# Patient Record
Sex: FEMALE | Race: WHITE | Employment: FULL TIME | URBAN - METROPOLITAN AREA
[De-identification: names, ages, dates, MRNs, and addresses within clinical notes are randomized per-mention and may not be internally consistent; named-entity substitution may affect disease eponyms.]

---

## 2019-05-15 ENCOUNTER — HOSPITAL ENCOUNTER (EMERGENCY)
Age: 29
Discharge: HOME | End: 2019-05-15
Attending: EMERGENCY MEDICINE
Payer: COMMERCIAL

## 2019-05-15 VITALS
HEIGHT: 68 IN | WEIGHT: 250.88 LBS | SYSTOLIC BLOOD PRESSURE: 158 MMHG | DIASTOLIC BLOOD PRESSURE: 96 MMHG | TEMPERATURE: 99 F | OXYGEN SATURATION: 99 % | BODY MASS INDEX: 38.02 KG/M2 | HEART RATE: 76 BPM

## 2019-05-15 DIAGNOSIS — M79.661 PAIN AND SWELLING OF RIGHT LOWER LEG: Primary | ICD-10-CM

## 2019-05-15 DIAGNOSIS — M79.89 PAIN AND SWELLING OF RIGHT LOWER LEG: Primary | ICD-10-CM

## 2019-05-15 DIAGNOSIS — M50.30 DDD (DEGENERATIVE DISC DISEASE), CERVICAL: ICD-10-CM

## 2019-05-15 PROCEDURE — 99283 EMERGENCY DEPT VISIT LOW MDM: CPT

## 2019-05-15 ASSESSMENT — PAIN DESCRIPTION - DESCRIPTORS: DESCRIPTORS: SORE

## 2019-05-15 ASSESSMENT — PAIN DESCRIPTION - ORIENTATION: ORIENTATION: RIGHT;LEFT

## 2019-05-15 ASSESSMENT — PAIN DESCRIPTION - FREQUENCY: FREQUENCY: CONTINUOUS

## 2019-05-15 ASSESSMENT — PAIN DESCRIPTION - LOCATION: LOCATION: LEG

## 2019-05-15 ASSESSMENT — PAIN SCALES - GENERAL: PAINLEVEL_OUTOF10: 4

## 2019-05-15 NOTE — ED PROVIDER NOTES
Emergency Physician Note    Chief Complaint  Leg Swelling       History of Present Illness  Manish Loredo is a 33 y.o. female who presents to the ED for leg swelling, right leg pain, transient episode of numbness and tingling of her lower feet and her hands. Patient states starting Wednesday she noticed bilateral lower extremity swelling, she's been using compression stockings intermittently since then and is somewhat helped with her symptoms. Today she was lying in bed when all of a sudden she felt numbness and tingling throughout of both lower extremities and in both hands. The symptoms were transient and resolved shortly thereafter. Patient feels that her right leg is a little bit more swollen than her left. She denies any chest pain or shortness of breath. She does have chronic neck pain that is unchanged and the pain is mostly on the left side of her neck. She denies any lower back pain at this time although sometimes she gets lower back pain intermittently. Denies fever, chills, malaise, chest pain, shortness of breath, cough, abdominal pain, nausea, vomiting, diarrhea, headache, sore throat, dysuria, back pain, rash. No palliative/provocative factors. Positives and pertinent negatives as per HPI. All other of the 10 systems were reviewed and are negative. I have reviewed the following from the nursing documentation:      Prior to Admission medications    Medication Sig Start Date End Date Taking? Authorizing Provider   amphetamine-dextroamphetamine (ADDERALL, 20MG,) 20 MG tablet Take 1 tablet by mouth 2 times daily for 31 days.  5/8/19 6/8/19 Yes Wm Wallace MD   ibuprofen (ADVIL;MOTRIN) 800 MG tablet Take 1 tablet by mouth every 8 hours as needed for Pain 4/11/19  Yes Wm Wallace MD   citalopram (CELEXA) 20 MG tablet Take 1 tablet by mouth daily 4/11/19  Yes Wm Wallace MD   metoprolol succinate (TOPROL XL) 50 MG extended release tablet Take 1 tablet by mouth daily 19  Yes Joel Sims MD   phentermine (ADIPEX-P) 37.5 MG capsule Take 1 capsule by mouth every morning for 30 days.  19  Joel Sims MD       Allergies as of 05/15/2019 - Review Complete 05/15/2019   Allergen Reaction Noted    Diflucan [fluconazole]  2017    Zithromax [azithromycin]  2017       Past Medical History:   Diagnosis Date    ADHD (attention deficit hyperactivity disorder)     not on meds        Surgical History:   Past Surgical History:   Procedure Laterality Date     SECTION          Family History:    Family History   Problem Relation Age of Onset    Cancer Mother         skin    Depression Mother     High Blood Pressure Mother     Early Death Father         kidney failure    Kidney Disease Father        Social History     Socioeconomic History    Marital status: Single     Spouse name: Not on file    Number of children: Not on file    Years of education: Not on file    Highest education level: Not on file   Occupational History    Not on file   Social Needs    Financial resource strain: Not on file    Food insecurity:     Worry: Not on file     Inability: Not on file    Transportation needs:     Medical: Not on file     Non-medical: Not on file   Tobacco Use    Smoking status: Current Every Day Smoker     Packs/day: 1.00    Smokeless tobacco: Never Used   Substance and Sexual Activity    Alcohol use: Yes     Comment: rarely    Drug use: No    Sexual activity: Yes     Partners: Male   Lifestyle    Physical activity:     Days per week: Not on file     Minutes per session: Not on file    Stress: Not on file   Relationships    Social connections:     Talks on phone: Not on file     Gets together: Not on file     Attends Mu-ism service: Not on file     Active member of club or organization: Not on file     Attends meetings of clubs or organizations: Not on file     Relationship status: Not on file    Intimate partner violence: Fear of current or ex partner: Not on file     Emotionally abused: Not on file     Physically abused: Not on file     Forced sexual activity: Not on file   Other Topics Concern    Not on file   Social History Narrative    Not on file       Nursing notes reviewed. ED Triage Vitals   Enc Vitals Group      BP 05/15/19 0234 (!) 159/100      Pulse 05/15/19 0234 76      Resp --       Temp 05/15/19 0239 99 °F (37.2 °C)      Temp Source 05/15/19 0239 Oral      SpO2 05/15/19 0234 100 %      Weight 05/15/19 0234 250 lb 14.1 oz (113.8 kg)      Height 05/15/19 0234 5' 8\" (1.727 m)      Head Circumference --       Peak Flow --       Pain Score --       Pain Loc --       Pain Edu? --       Excl. in [place]? --        GENERAL:  Awake, alert. Well developed, well nourished with no apparent distress. HENT:  Normocephalic, Atraumatic, no lacerations. EYES:  Conjunctiva normal, Pupils equal round and reactive to light, extraocular movements normal.  NECK:  Trachea is midline. No stridor. CHEST:  Regular rate and regular rhythm, no murmurs/rubs/gallops, normal S1/S2, chest wall non-tender. LUNGS:  No respiratory distress. No abdominal retractions, no sternal retractions. Clear to auscultation bilaterally, no wheezing, no rhochi, no rales  ABDOMEN:  Soft, non-tender, non-distended. No guarding and no rebound. No costovertebral angle tenderness to palpation. Normal BS, no organomegaly, no abdominal masses  EXTREMITIES:  Normal range of motion, no pitting edema, no tenderness, no deformity, distal pulses present and equal bilaterally. Moves extremities x4 with purpose. The right calf does appear slightly more swollen compared to the left calf, there is some reproducible tenderness to palpation of the right calf. BACK:  No midline tenderness in the cervical, thoracic, and lumbar spine. No deformities, no step-off. Palpation did not elicit any paraspinous tenderness. SKIN: Warm, dry and intact.    NEUROLOGIC: Normal mental status. Moving all extremities to command. Alert and oriented x4 without focal deficit or gross sensory deficit. Normal speech. MUR exam of both upper extremities are without focal neurological deficits. PSYCHIATRIC: Not anxious, normal mood and affect, thoughts are linear and organized, without delusions/hallucinations, responds appropriately to questions      LABS and DIAGNOSTIC RESULTS    LABS  No results found for this visit on 05/15/19. PROCEDURES      MEDICAL DECISION MAKING    Procedures/interventions/images ordered for this visit  No orders of the defined types were placed in this encounter. Medications ordered for this visit  No orders of the defined types were placed in this encounter. This is a very pleasant patient who has been seen and evaluated for a possible DVT. There was no significant trauma to suspect fracture, or evidence of infection, compartment syndrome, or any neurovascular compromise on exam.  Patient's vitals have been reviewed and are stable. Based on these findings and the fact that the patient is not presenting with any acute cardiopulmonary symptoms, there is no concern specifically for an acute pulmonary embolism. Doppler ultrasound was not completed today given that vascular ultrasound is not in hospital at this time, patient was given orders to have a follow-up study done as an outpatient. Patient is aware that today's workup does not fully exclude the possibility of a DVT. Patient has been instructed to follow-up with their regular doctor for a repeat ultrasound within one week to fully exclude this diagnosis.       High Sensitivity Neuro Exam (HSNE) Spine  If Neck Pain:  C1-2, 3, 4 Sensation back of head and neck: Present  C5 Deltoid (motor): Present  C6 Biceps (motor): Present  C7 Extend Wrist/Fingers: Present  C8 Flex Fingers: Present  T1 Move fingers apart: Present    Red Flags  Minor (1 Point Each)  Alcohol Abuse: +0 No  Diabetes Mellitus: +0 or other process requiring immediate medical or surgical intervention at this time. We will attempt to manage the patients pain. It is understood that if the patient is not improving as expected or if other new symptoms or signs of concern develop, other etiologies or diagnoses may need to be considered requiring other tests, treatments, consultations, and/or admission. The diagnosis, plan, expected course, follow-up, and return precautions were discussed and all questions were answered. Final Impression    1. Pain and swelling of right lower leg    2. DDD (degenerative disc disease), cervical        Blood pressure (!) 158/96, pulse 76, temperature 99 °F (37.2 °C), temperature source Oral, height 5' 8\" (1.727 m), weight 250 lb 14.1 oz (113.8 kg), SpO2 99 %, unknown if currently breastfeeding. Patient and/or companions verbalized understanding of the ED workup, any relevant findings as well as any incidental findings, and the disposition and plan. Questions sought and answered with the patient and/or family members. They voice understanding and agree with plan. If the patient was discharged from the ED, they were instructed to return for any worsening or worrisome concerns. Patient was given scripts for the following medications. I counseled patient how to take these medications. Discharge Medication List as of 5/15/2019  4:04 AM          Disposition  Pt is in stable condition upon Discharge to home. The note was completed using Dragon voice recognition transcription. Every effort was made to ensure accuracy; however, inadvertent transcription errors may be present despite my best efforts to edit errors.     Laura Abel MD  [address] MD  05/15/19 0583

## 2019-05-15 NOTE — ED TRIAGE NOTES
Bilat lower extremity edema for a few days. Pt has consulted PCP about this and was told to wear rebecca hose because she was retaining water. Pt has been wearing those but states that tonight her feet and legs felt numb and she had a burning sensation in her thighs.

## 2021-05-18 ENCOUNTER — HOSPITAL ENCOUNTER (EMERGENCY)
Age: 31
Discharge: HOME | End: 2021-05-18
Attending: EMERGENCY MEDICINE
Payer: COMMERCIAL

## 2021-05-18 VITALS
WEIGHT: 268.74 LBS | SYSTOLIC BLOOD PRESSURE: 122 MMHG | OXYGEN SATURATION: 99 % | HEART RATE: 70 BPM | DIASTOLIC BLOOD PRESSURE: 87 MMHG | RESPIRATION RATE: 18 BRPM | TEMPERATURE: 98.5 F | BODY MASS INDEX: 40.86 KG/M2

## 2021-05-18 DIAGNOSIS — J02.9 ACUTE PHARYNGITIS, UNSPECIFIED ETIOLOGY: Primary | ICD-10-CM

## 2021-05-18 PROCEDURE — 99284 EMERGENCY DEPT VISIT MOD MDM: CPT

## 2021-05-18 PROCEDURE — 6360000002 HC RX W HCPCS: Performed by: EMERGENCY MEDICINE

## 2021-05-18 PROCEDURE — 6370000000 HC RX 637 (ALT 250 FOR IP): Performed by: EMERGENCY MEDICINE

## 2021-05-18 RX ORDER — NAPROXEN 500 MG/1
500 TABLET ORAL 2 TIMES DAILY WITH MEALS
Qty: 60 TABLET | Refills: 0 | Status: SHIPPED | OUTPATIENT
Start: 2021-05-18 | End: 2021-08-10

## 2021-05-18 RX ORDER — IBUPROFEN 400 MG/1
400 TABLET ORAL ONCE
Status: COMPLETED | OUTPATIENT
Start: 2021-05-18 | End: 2021-05-18

## 2021-05-18 RX ORDER — DEXAMETHASONE SODIUM PHOSPHATE 4 MG/ML
4 INJECTION, SOLUTION INTRA-ARTICULAR; INTRALESIONAL; INTRAMUSCULAR; INTRAVENOUS; SOFT TISSUE ONCE
Status: COMPLETED | OUTPATIENT
Start: 2021-05-18 | End: 2021-05-18

## 2021-05-18 RX ADMIN — IBUPROFEN 400 MG: 400 TABLET, FILM COATED ORAL at 06:41

## 2021-05-18 RX ADMIN — DEXAMETHASONE SODIUM PHOSPHATE 4 MG: 4 INJECTION, SOLUTION INTRAMUSCULAR; INTRAVENOUS at 06:41

## 2021-05-18 ASSESSMENT — PAIN SCALES - GENERAL
PAINLEVEL_OUTOF10: 10
PAINLEVEL_OUTOF10: 7

## 2021-05-18 ASSESSMENT — PAIN - FUNCTIONAL ASSESSMENT: PAIN_FUNCTIONAL_ASSESSMENT: 0-10

## 2021-05-18 NOTE — ED PROVIDER NOTES
Emergency Physician Note  [address] [facility] [place] [address]  Dept: [phone]  Loc: [phone]  Open Note Time:  6:31 AM EDT    Chief Complaint  Pharyngitis (woke up this morning with sore throat, denies any fevrs. c/o headache. )       History of Present Illness  Jasbir Prasad is a 35 y.o. female  has a past medical history of ADHD (attention deficit hyperactivity disorder) and Hypertension. who presents to the ED for throat. Patient thinks that she may have had a little bit of itchiness in her throat last night however when she woke up this morning she had significant sore throat she states it painful to swallow however she is still able to swallow fluids. She has had intermittent mild cough. No fever. She reports that she has a headache it is frontal is across her forehead however she believes it is due to her sinuses being congested. She is also had some runny nose recently. Denies fever, chills, malaise, chest pain, shortness of breath,  abdominal pain, nausea, vomiting, diarrhea. No palliative/provocative factors. Unless otherwise stated in this report or unable to obtain because of the patient's clinical or mental status as evidenced by the medical record, this patient's positive and negative responses for review of systems, constitutional, psych, eyes, ENT, cardiovascular, respiratory, gastrointestinal, neurological, genitourinary, musculoskeletal, integument systems and systems related to the presenting problem are either stated in the preceding paragraph or were not pertinent or were negative for the symptoms and/or complaints related to the medical problem. I have reviewed the following from the nursing documentation:      Prior to Admission medications    Medication Sig Start Date End Date Taking?  Authorizing Provider   dexamethasone (DEXAMETHASONE INTENSOL) 1 MG/ML solution Take 4 mLs by mouth daily as needed (sore throat) 21  Yes Annalee Arteaga MD   naproxen (NAPROSYN) 500 MG tablet Take 1 tablet by mouth 2 times daily (with meals) 21  Yes Annalee Arteaga MD   amphetamine-dextroamphetamine (ADDERALL, 20MG,) 20 MG tablet Take 1 tablet by mouth 2 times daily for 31 days. 5/10/21 6/10/21 Yes Dayna Arizmendi MD   metoprolol succinate (TOPROL XL) 100 MG extended release tablet Take 1 tablet by mouth daily 5/10/21  Yes Dayna Arizmendi MD   albuterol sulfate HFA (VENTOLIN HFA) 108 (90 Base) MCG/ACT inhaler Inhale 2 puffs into the lungs 4 times daily as needed for Wheezing 21   Dayna Arizmendi MD   ibuprofen (ADVIL;MOTRIN) 800 MG tablet Take 1 tablet by mouth every 8 hours as needed for Pain 3/2/21   Dayna Arizmendi MD   ketoconazole (NIZORAL) 2 % cream Apply topically daily.  20   Dayna Arizmendi MD   albuterol sulfate HFA (VENTOLIN HFA) 108 (90 Base) MCG/ACT inhaler Inhale 2 puffs into the lungs 4 times daily as needed for Wheezing 20   Dayna Arizmendi MD       Allergies as of 2021 - Fully Reviewed 2021   Allergen Reaction Noted    Diflucan [fluconazole]  2017    Zithromax [azithromycin]  2017       Past Medical History:   Diagnosis Date    ADHD (attention deficit hyperactivity disorder)     not on meds    Hypertension         Surgical History:   Past Surgical History:   Procedure Laterality Date     SECTION          Family History:    Family History   Problem Relation Age of Onset    Cancer Mother         skin    Depression Mother     High Blood Pressure Mother     Early Death Father         kidney failure    Kidney Disease Father        Social History     Socioeconomic History    Marital status: Single     Spouse name: Not on file    Number of children: Not on file    Years of education: Not on file    Highest education level: Not on file   Occupational History    Not on file   Tobacco Use    Smoking status: Current Every Day Smoker     Packs/day: 1.00    Smokeless tobacco: Never Used   Substance and Sexual Activity    Alcohol use: Yes     Comment: rarely    Drug use: No    Sexual activity: Yes     Partners: Male   Other Topics Concern    Not on file   Social History Narrative    Not on file     Social Determinants of Health     Financial Resource Strain:     Difficulty of Paying Living Expenses:    Food Insecurity:     Worried About Running Out of Food in the Last Year:     [address] in the Last Year:    Transportation Needs:     Lack of Transportation (Medical):  Lack of Transportation (Non-Medical):    Physical Activity:     Days of Exercise per Week:     Minutes of Exercise per Session:    Stress:     Feeling of Stress :    Social Connections:     Frequency of Communication with Friends and Family:     Frequency of Social Gatherings with Friends and Family:     Attends [employer] Services:     Active Member of Clubs or Organizations:     Attends Club or Organization Meetings:     Marital Status:    Intimate Partner Violence:     Fear of Current or Ex-Partner:     Emotionally Abused:     Physically Abused:     Sexually Abused:        Nursing notes reviewed. ED Triage Vitals [05/18/21 0617]   Enc Vitals Group      /87      Pulse 70      Resp 18      Temp 98.5 °F (36.9 °C)      Temp Source Oral      SpO2 99 %      Weight 268 lb 11.9 oz (121.9 kg)      Height       Head Circumference       Peak Flow       Pain Score       Pain Loc       Pain Edu? Excl. in [address]? GENERAL:   Body mass index is 40.86 kg/m². Awake, alert. Well developed, well nourished with no apparent distress. Nontoxic-appearing, non-ill-appearing. HENT:   Normocephalic, Atraumatic, no lacerations.   Tympanic membranes are without bulging, without erythema, without hemotympanum, with middle ear effusion bilaterally  Oropharynx clear, no tonsilar inflammation, no tonsilar exudates,  no airway obstruction, moist mucous membranes. Uvula was midline and has symmetric rise. EYES:   Conjunctiva normal,   Pupils equal round and reactive to light,   Extraocular movements normal.  NECK:  Trachea is midline. No stridor. No lymphadenopathy of the anterior cervical chain  No lymphadenopathy of the posterior cervical chain. No meningeal signs, Supple without JVD. CHEST:  Regular rate and regular rhythm, no murmurs/rubs/gallops,  normal S1/S2, chest wall non-tender. LUNGS:  No respiratory distress. No abdominal retractions, no sternal retractions  Clear to auscultation bilaterally, no wheezing, no rhochi, no rales  Speaking comfortably in full sentences  ABDOMEN:  Soft, non-tender, non-distended. No guarding. No rebound. No costovertebral angle tenderness to palpation. Normal BS, no organomegaly, no abdominal masses  EXTREMITIES:  Moves extremities x4 with purpose. Normal range of motion, no edema,  No tenderness, no deformity,  distal pulses present and equal bilaterally. BACK:  No midline tenderness in the cervical, thoracic, and lumbar spine. No deformities, no step-off. SKIN:  Warm, dry and intact. NEUROLOGIC:  Normal mental status. Moving all extremities to command. Alert and oriented x4  without focal motor deficit or gross sensory deficit. Normal speech. PSYCHIATRIC:  Not anxious,  normal mood and affect,  Appropriate eye contact,  thoughts are linear and organized,  without delusions/hallucinations,  Not responding to internal stimuli,  responds appropriately to questions    LABS and DIAGNOSTIC RESULTS    LABS  No results found for this visit on 05/18/21. SCREENINGS  NIH Score     [facility]     [facility]    Heart Score       PROCEDURES    MEDICAL DECISION MAKING    Procedures/interventions/images ordered for this visit  No orders of the defined types were placed in this encounter.       Medications ordered for this visit  Orders Placed This Encounter   Medications    ibuprofen (ADVIL;MOTRIN) 100 MG/5ML suspension 600 mg    dexamethasone (DECADRON) 1 MG/ML solution 4 mg    dexamethasone (DEXAMETHASONE INTENSOL) 1 MG/ML solution     Sig: Take 4 mLs by mouth daily as needed (sore throat)     Dispense:  4 mL     Refill:  0    naproxen (NAPROSYN) 500 MG tablet     Sig: Take 1 tablet by mouth 2 times daily (with meals)     Dispense:  60 tablet     Refill:  0       ED course notes for this visit       I wore surgical mask and gloves when I evaluated the patient. I evaluated the patient in room QC 01/01    Differential diagnosis: Group A strep, Airway Obstruction, Epiglottitis, Retropharyngeal Abscess, Parapharyngeal Abscess, Pneumonia, Hypoxemia, Dehydration, Reflux Pharyngitis, other    Patient was treated with pain medications and steroids for symptomatic relief. Based on Censor criteria patient did not need a rapid strep. This is a very pleasant patient with pharyngitis but without significant evidence of Group A Beta-Hemolytic Streptococcal infection or mononucleosis by clinical and/or laboratory criteria. The patient does not have erythematous tonsils and her tonsils are without exudate and no lymphadenopathy. In addition to the noted physical exam, I did not observe brawny edema, uvular deviation, menigismus, stridor, trismus, or drooling. Nontoxic appearance and no significant distress. No sign of Aroldos Angina or deep space neck infection. There is no significant evidence of toxicity, shock, sepsis, airway compromise, respiratory distress, hemodynamic instability, epiglottitis, peritonsillar abscess, retropharyngeal abscess, bacterial tracheitis, other bacterial infection, acute allergic reaction or angioedema, or any disease process requiring immediate surgical intervention at this time.  It is understood that if the patient is not improving as expected or if other new symptoms or signs of concern develop, other etiologies or diagnoses may need to be considered requiring other tests, treatments, consultations, and/or admission. The diagnosis, plan, expected course, follow-up, and return precautions were discussed and all questions were answered. Final Impression    1. Acute pharyngitis, unspecified etiology        Blood pressure 122/87, pulse 70, temperature 98.5 °F (36.9 °C), temperature source Oral, resp. rate 18, weight 268 lb 11.9 oz (121.9 kg), SpO2 99 %, unknown if currently breastfeeding. Medication Summary  Patient was given scripts for the following medications. I counseled patient how to take these medications. New Prescriptions    DEXAMETHASONE (DEXAMETHASONE INTENSOL) 1 MG/ML SOLUTION    Take 4 mLs by mouth daily as needed (sore throat)    NAPROXEN (NAPROSYN) 500 MG TABLET    Take 1 tablet by mouth 2 times daily (with meals)       Patient had scripts modified or refilled for the following medications. I counseled patient how to take these medications. Modified Medications    No medications on file       Patient had scripts discontinues for the following medications. I counseled patient to stop taking these medications. Discontinued Medications    No medications on file         Disposition  At this point I do not feel the patient requires further work up and it is reasonable to discharge the patient. I had a discussion with the patient and/or their surrogate regarding diagnosis, diagnostic testing results, treatment/ plan of care, and follow up. Patient and/or companions verbalized understanding of the ED workup, any relevant findings as well as any incidental findings, and the disposition and plan. There was shared decision-making between myself as well as the patient and/or their surrogate and we are all in agreement with discharge home. Damien Pittman was an opportunity for questions and all questions were answered to the best of my ability and to the satisfaction of the patient and/or patient's family. Patient agreed to follow up as recommend for further evaluation/treatment.  The patient was given strict return precautions as we discussed symptoms that would necessitate return to the ED. Patient will return to ED for new/worsening symptoms. The patient verbalized their understanding and agreement with the above plan. Please refer to AVS for further details regarding discharge instructions. Pt is in stable condition upon Discharge to home. The note was completed using Dragon voice recognition transcription. Every effort was made to ensure accuracy; however, inadvertent transcription errors may be present despite my best efforts to edit errors.     Tawanda Bates MD  [address] [place], MD  05/18/21 6094

## 2021-06-20 ENCOUNTER — APPOINTMENT (OUTPATIENT)
Dept: GENERAL RADIOLOGY | Age: 31
End: 2021-06-20
Payer: COMMERCIAL

## 2021-06-20 ENCOUNTER — HOSPITAL ENCOUNTER (EMERGENCY)
Age: 31
Discharge: HOME | End: 2021-06-20
Attending: EMERGENCY MEDICINE
Payer: COMMERCIAL

## 2021-06-20 VITALS
WEIGHT: 272.49 LBS | BODY MASS INDEX: 41.43 KG/M2 | DIASTOLIC BLOOD PRESSURE: 88 MMHG | SYSTOLIC BLOOD PRESSURE: 136 MMHG | OXYGEN SATURATION: 99 % | RESPIRATION RATE: 16 BRPM | TEMPERATURE: 99.6 F | HEART RATE: 86 BPM
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
## 2021-06-20 DIAGNOSIS — M54.50 ACUTE LOW BACK PAIN DUE TO TRAUMA: ICD-10-CM

## 2021-06-20 DIAGNOSIS — T07.XXXA MULTIPLE CONTUSIONS: ICD-10-CM

## 2021-06-20 DIAGNOSIS — G89.11 ACUTE LOW BACK PAIN DUE TO TRAUMA: ICD-10-CM

## 2021-06-20 LAB — HCG(URINE) PREGNANCY TEST: NEGATIVE

## 2021-06-20 PROCEDURE — 99284 EMERGENCY DEPT VISIT MOD MDM: CPT

## 2021-06-20 PROCEDURE — 84703 CHORIONIC GONADOTROPIN ASSAY: CPT

## 2021-06-20 PROCEDURE — 72100 X-RAY EXAM L-S SPINE 2/3 VWS: CPT

## 2021-06-20 PROCEDURE — 71046 X-RAY EXAM CHEST 2 VIEWS: CPT

## 2021-06-20 PROCEDURE — 73590 X-RAY EXAM OF LOWER LEG: CPT

## 2021-06-20 PROCEDURE — 6370000000 HC RX 637 (ALT 250 FOR IP): Performed by: EMERGENCY MEDICINE

## 2021-06-20 PROCEDURE — 73090 X-RAY EXAM OF FOREARM: CPT

## 2021-06-20 RX ORDER — IBUPROFEN 600 MG/1
600 TABLET ORAL ONCE
Status: COMPLETED | OUTPATIENT
Start: 2021-06-20 | End: 2021-06-20

## 2021-06-20 RX ORDER — CYCLOBENZAPRINE HCL 10 MG
10 TABLET ORAL 3 TIMES DAILY PRN
Qty: 20 TABLET | Refills: 0 | Status: SHIPPED | OUTPATIENT
Start: 2021-06-20 | End: 2021-06-30

## 2021-06-20 RX ORDER — IBUPROFEN 200 MG
600 TABLET ORAL EVERY 8 HOURS PRN
Qty: 60 TABLET | Refills: 0 | Status: SHIPPED | OUTPATIENT
Start: 2021-06-20 | End: 2021-08-10 | Stop reason: SDUPTHER

## 2021-06-20 RX ADMIN — IBUPROFEN 600 MG: 600 TABLET, FILM COATED ORAL at 21:14

## 2021-06-20 ASSESSMENT — PAIN DESCRIPTION - DESCRIPTORS: DESCRIPTORS: SHOOTING

## 2021-06-20 ASSESSMENT — PAIN DESCRIPTION - LOCATION: LOCATION: BACK;ARM

## 2021-06-20 ASSESSMENT — PAIN SCALES - GENERAL
PAINLEVEL_OUTOF10: 7
PAINLEVEL_OUTOF10: 6
PAINLEVEL_OUTOF10: 5
PAINLEVEL_OUTOF10: 5

## 2021-06-20 ASSESSMENT — PAIN DESCRIPTION - PAIN TYPE: TYPE: ACUTE PAIN

## 2021-06-20 ASSESSMENT — PAIN DESCRIPTION - ORIENTATION: ORIENTATION: RIGHT;LEFT;LOWER;MID;UPPER

## 2021-06-21 NOTE — ED PROVIDER NOTES
[address]  Chief Complaint   Patient presents with   Waqas Hosteller     \"slipped in water at [facility] and went down hard\" immediate RLL pain and L back, side pain, denies numbness or tingling of extremities, denies incontinence of bowels or bladder, L back pain \"shoots up to my shoulder blade\" pain worse with palpation, no obvious bruising or deformities noted     HISTORY OF PRESENT ILLNESS  Arron Rocha is a 28 y.o. female who presents to the ED complaining of around 7:15pm when she slipped on a puddle on the ground at [employer] while shopping. [place] [employer] forward and landed on her left forearm, right calf, and left lower and mid back. No head or neck injury. No lacerations. No numbness or weakness to the arms or legs. Not anticoagulated. No LOC or n/v since injury. No loss of b/b function or urinary retention. Denies pregnancy. She is sore generally. Has not tried any medications. No other complaints, modifying factors or associated symptoms. Nursing notes reviewed.    Past Medical History:   Diagnosis Date    ADHD (attention deficit hyperactivity disorder)     not on meds    Hypertension      Past Surgical History:   Procedure Laterality Date     SECTION       Family History   Problem Relation Age of Onset    Cancer Mother         skin    Depression Mother     High Blood Pressure Mother     Early Death Father         kidney failure    Kidney Disease Father      Social History     Socioeconomic History    Marital status: Single     Spouse name: Not on file    Number of children: Not on file    Years of education: Not on file    Highest education level: Not on file   Occupational History    Not on file   Tobacco Use    Smoking status: Current Every Day Smoker     Packs/day: 0.50    Smokeless tobacco: Never Used   Substance and Sexual Activity    Alcohol use: Yes     Comment: rarely    Drug use: No    Sexual activity: Yes     Partners: Male   Other Topics Concern    Not on file   Social History Narrative    Not on file     Social Determinants of Health     Financial Resource Strain:     Difficulty of Paying Living Expenses:    Food Insecurity:     Worried About Running Out of Food in the Last Year:     [address] in the Last Year:    Transportation Needs:     Lack of Transportation (Medical):  Lack of Transportation (Non-Medical):    Physical Activity:     Days of Exercise per Week:     Minutes of Exercise per Session:    Stress:     Feeling of Stress :    Social Connections:     Frequency of Communication with Friends and Family:     Frequency of Social Gatherings with Friends and Family:     Attends L[employer] day Services:     Active Member of Clubs or Organizations:     Attends Club or Organization Meetings:     Marital Status:    Intimate Partner Violence:     Fear of Current or Ex-Partner:     Emotionally Abused:     Physically Abused:     Sexually Abused:      No current facility-administered medications for this encounter. Current Outpatient Medications   Medication Sig Dispense Refill    ibuprofen (ADVIL) 200 MG tablet Take 3 tablets by mouth every 8 hours as needed for Pain 60 tablet 0    cyclobenzaprine (FLEXERIL) 10 MG tablet Take 1 tablet by mouth 3 times daily as needed for Muscle spasms (CAUTION: Can cause dizziness, don't drive while taking.) 20 tablet 0    amphetamine-dextroamphetamine (ADDERALL, 20MG,) 20 MG tablet Take 1 tablet by mouth 2 times daily for 31 days.  60 tablet 0    metoprolol succinate (TOPROL XL) 100 MG extended release tablet Take 1 tablet by mouth daily 30 tablet 2    albuterol sulfate HFA (VENTOLIN HFA) 108 (90 Base) MCG/ACT inhaler Inhale 2 puffs into the lungs 4 times daily as needed for Wheezing 3 Inhaler 1    albuterol sulfate HFA (VENTOLIN HFA) 108 (90 Base) MCG/ACT inhaler Inhale 2 puffs into the lungs 4 times daily as needed for Wheezing 3 Inhaler 1    brompheniramine-pseudoephedrine 1-15 MG/5ML ELIX elixir Take 5 mLs by mouth every 6 hours as needed (cough) 420 mL 0    naproxen (NAPROSYN) 500 MG tablet Take 1 tablet by mouth 2 times daily (with meals) 60 tablet 0    ketoconazole (NIZORAL) 2 % cream Apply topically daily. 30 g 1     Allergies   Allergen Reactions    Diflucan [Fluconazole]     Zithromax [Azithromycin]        REVIEW OF SYSTEMS  6 systems reviewed, pertinent positives per HPI otherwise noted to be negative    PHYSICAL EXAM   BP (!) 151/100   Pulse 93   Temp 99.6 °F (37.6 °C) (Oral)   Resp 16   Wt 272 lb 7.8 oz (123.6 kg)   LMP 06/08/2021 (Approximate)   SpO2 98%   BMI 41.43 kg/m²    GENERAL APPEARANCE: Awake and alert. Cooperative. No acute distress. HEAD: Normocephalic. Atraumatic. EYES: PERRL. EOM's grossly intact. ENT: Mucous membranes are moist.   BACK:      Cervical: no tenderness noted, no midline tenderness, no paraspinous spasm      Thoracic: no midline tenderness, mild L paraspinal ttp, no R paraspinal ttp      Lumbar: mild L paraspinal ttp, no R paraspinal ttp or midline ttp, no stepoff/deformity noted. CHEST: Equal symmetric chest rise. RRR. LUNGS: Breathing is unlabored. Speaking comfortably in full sentences. CTAB  ABDOMEN: Nondistended, nontender  MUSCULOSKELETAL:  RUE: No tenderness. 2+ radial pulse. Brisk cap refill x5 digits. Sensation and motor function fully intact in the radial, ulnar, and median nerve distribution. Full range of motion of all major joints. Cardinal movements of hand fully intact. No erythema, bruising, or lacerations. Comparments are soft. LUE: Ttp diffusely in the mid/distal forearm but not focally over the carpal bones of the wrist, the hand, the phalanges, elbow joint, or humerus/shoulder. 2+ radial pulse. Brisk cap refill x5 digits. Sensation and motor function fully intact in the radial, ulnar, and median nerve distribution. Full range of motion of all major joints.   Cardinal movements of hand fully intact. No erythema, bruising, or lacerations. Comparments are soft. RLE: Mild R calf and anterior knee ttp but no focal ttp at the ankle joint, foot, thigh or hip. No deformity noted. No knee joint laxity. 2+ DP and PT. Sensation and motor function fully intact. Full range of motion of all major joints. No erythema, bruising, or lacerations. Compartments are soft. 2+ patellar reflex. Achilles nontender and intact. Able to bear weight. No joint swelling or effusions are noted. LLE: No tenderness. 2+ DP and PT. Sensation and motor function fully intact. Full range of motion of all major joints. No erythema, bruising, or lacerations. Compartments are soft. 2+ patellar reflex. Achilles nontender and intact. Able to bear weight. No joint swelling or effusions are noted. SKIN: Warm and dry. No acute rashes. NEUROLOGICAL: Alert and oriented. Strength is 5/5 in all extremities and sensation is intact. RADIOLOGY    XR CHEST (2 VW)    Result Date: 6/20/2021  EXAMINATION: TWO XRAY VIEWS OF THE CHEST 6/20/2021 9:42 pm COMPARISON: 09/03/2018 HISTORY: ORDERING SYSTEM PROVIDED HISTORY: trauma TECHNOLOGIST PROVIDED HISTORY: Reason for exam:->trauma Reason for Exam: Trauma Acuity: Acute Type of Exam: Subsequent/Follow-up Mechanism of Injury: Fall (\"slipped in water at [employer] and went down hard\" immediate RLL pain and L back, side pain, denies numbness or tingling of extremities, denies incontinence of bowels or bladder, L back pain \"shoots up to my shoulder blade\" pain worse with palpation, no obvious bruising or deformities noted) Relevant Medical/Surgical History: Current Every Day Smoker, 0.5 ppd; Alcohol:Yes. Hx of obesity, hypertenson, and asthma. FINDINGS: No focal consolidation, pleural effusion or pneumothorax. The cardiomediastinal silhouette is stable. No overt pulmonary edema. The osseous structures are stable. No acute cardiopulmonary findings.      XR LUMBAR unremarkable. No elbow joint effusion. No acute osseous abnormality. XR TIBIA FIBULA RIGHT (2 VIEWS)    Result Date: 6/20/2021  EXAMINATION: 2 XRAY VIEWS OF THE RIGHT TIBIA AND FIBULA 6/20/2021 9:43 pm COMPARISON: None. HISTORY: ORDERING SYSTEM PROVIDED HISTORY: trauma TECHNOLOGIST PROVIDED HISTORY: Reason for exam:->trauma Reason for Exam: Trauma Acuity: Acute Type of Exam: Subsequent/Follow-up Mechanism of Injury: Fall (\"slipped in water at [employer] and went down hard\" immediate RLL pain and L back, side pain, denies numbness or tingling of extremities, denies incontinence of bowels or bladder, L back pain \"shoots up to my shoulder blade\" pain worse with palpation, no obvious bruising or deformities noted) Relevant Medical/Surgical History: Current Every Day Smoker, 0.5 ppd; Alcohol:Yes. Hx of obesity, hypertenson, and asthma. FINDINGS: No acute fracture or dislocation. Joint spaces and alignment are maintained. Soft tissues are unremarkable. No acute osseous abnormality. ED COURSE/MDM  Differential diagnosis considerations included: abrasion/laceration, contusion, fracture, sprain/strain, dislocation    The patient's ED course was notable for mechanical slip and fall, with injuries primarily to the left forearm and right calf/knee, also to the lumbar spine and thoracic back both on the left side. She has no concerning features for spinal cord injury or cauda equina syndrome. X-rays of the chest,, left forearm and right tib-fib are nonacute/negative. Symptomatic management with nsaids/muscle relaxers and f/u with PCP PRN. Patient was given scripts for the following medications. I counseled patient how to take these medications.    New Prescriptions    CYCLOBENZAPRINE (FLEXERIL) 10 MG TABLET    Take 1 tablet by mouth 3 times daily as needed for Muscle spasms (CAUTION: Can cause dizziness, don't drive while taking.)    IBUPROFEN (ADVIL) 200 MG TABLET    Take 3 tablets by mouth every 8 hours as needed for Pain         CLINICAL IMPRESSION  1. Fall on same level from slipping, tripping or stumbling, initial encounter    2. Multiple contusions    3. Acute low back pain due to trauma        Blood pressure (!) 151/100, pulse 93, temperature 99.6 °F (37.6 °C), temperature source Oral, resp. rate 16, weight 272 lb 7.8 oz (123.6 kg), last menstrual period 06/08/2021, SpO2 98 %, unknown if currently breastfeeding. DISPOSITION    I have discussed the findings of today's workup with the patient and addressed the patient's questions and concerns. Important warning signs as well as new or worsening symptoms which would necessitate immediate return to the ED were discussed. The plan is to discharge from the ED at this time, and the patient is in stable condition. The patient acknowledged understanding is agreeable with this plan. Follow-up with:  [facility] [id number]  [phone]    Schedule an appointment as soon as possible for a visit in 1 week  For symptom re-evaluation    [facility] [address] [phone]  Go to   If symptoms worsen      This chart was created using Dragon dictation software. Efforts were made by me to ensure accuracy, however some errors may be present due to limitations of this technology.         Jhonatan Calderón MD  06/20/21 0023

## 2021-08-31 ENCOUNTER — HOSPITAL ENCOUNTER (EMERGENCY)
Age: 31
Discharge: HOME | End: 2021-08-31
Attending: EMERGENCY MEDICINE
Payer: COMMERCIAL

## 2021-08-31 VITALS
WEIGHT: 230 LBS | HEIGHT: 68 IN | OXYGEN SATURATION: 95 % | DIASTOLIC BLOOD PRESSURE: 107 MMHG | RESPIRATION RATE: 16 BRPM | TEMPERATURE: 98.4 F | HEART RATE: 78 BPM | SYSTOLIC BLOOD PRESSURE: 158 MMHG | BODY MASS INDEX: 34.86 KG/M2

## 2021-08-31 DIAGNOSIS — J06.9 UPPER RESPIRATORY TRACT INFECTION, UNSPECIFIED TYPE: Primary | ICD-10-CM

## 2021-08-31 LAB
S PYO AG THROAT QL: NEGATIVE
SARS-COV-2: NOT DETECTED

## 2021-08-31 PROCEDURE — 6370000000 HC RX 637 (ALT 250 FOR IP): Performed by: EMERGENCY MEDICINE

## 2021-08-31 PROCEDURE — U0003 INFECTIOUS AGENT DETECTION BY NUCLEIC ACID (DNA OR RNA); SEVERE ACUTE RESPIRATORY SYNDROME CORONAVIRUS 2 (SARS-COV-2) (CORONAVIRUS DISEASE [COVID-19]), AMPLIFIED PROBE TECHNIQUE, MAKING USE OF HIGH THROUGHPUT TECHNOLOGIES AS DESCRIBED BY CMS-2020-01-R: HCPCS

## 2021-08-31 PROCEDURE — 87880 STREP A ASSAY W/OPTIC: CPT

## 2021-08-31 PROCEDURE — U0005 INFEC AGEN DETEC AMPLI PROBE: HCPCS

## 2021-08-31 PROCEDURE — 87081 CULTURE SCREEN ONLY: CPT

## 2021-08-31 PROCEDURE — 99283 EMERGENCY DEPT VISIT LOW MDM: CPT

## 2021-08-31 RX ORDER — BENZONATATE 100 MG/1
100 CAPSULE ORAL ONCE
Status: COMPLETED | OUTPATIENT
Start: 2021-08-31 | End: 2021-08-31

## 2021-08-31 RX ORDER — BENZONATATE 100 MG/1
100 CAPSULE ORAL 3 TIMES DAILY PRN
Qty: 21 CAPSULE | Refills: 0 | Status: SHIPPED | OUTPATIENT
Start: 2021-08-31 | End: 2021-09-07

## 2021-08-31 RX ADMIN — BENZONATATE 100 MG: 100 CAPSULE ORAL at 02:40

## 2021-08-31 ASSESSMENT — PAIN DESCRIPTION - DESCRIPTORS
DESCRIPTORS: SORE

## 2021-08-31 ASSESSMENT — PAIN DESCRIPTION - LOCATION
LOCATION: THROAT

## 2021-08-31 ASSESSMENT — PAIN DESCRIPTION - PAIN TYPE
TYPE: ACUTE PAIN

## 2021-08-31 ASSESSMENT — PAIN - FUNCTIONAL ASSESSMENT
PAIN_FUNCTIONAL_ASSESSMENT: ACTIVITIES ARE NOT PREVENTED
PAIN_FUNCTIONAL_ASSESSMENT: ACTIVITIES ARE NOT PREVENTED
PAIN_FUNCTIONAL_ASSESSMENT: 0-10
PAIN_FUNCTIONAL_ASSESSMENT: ACTIVITIES ARE NOT PREVENTED

## 2021-08-31 ASSESSMENT — PAIN DESCRIPTION - ONSET
ONSET: ON-GOING

## 2021-08-31 ASSESSMENT — PAIN SCALES - GENERAL
PAINLEVEL_OUTOF10: 4

## 2021-08-31 ASSESSMENT — PAIN DESCRIPTION - PROGRESSION: CLINICAL_PROGRESSION: NOT CHANGED

## 2021-08-31 NOTE — ED PROVIDER NOTES
Emergency Physician Note        Note Open Time: 2:24 AM EDT    Chief Complaint  Pharyngitis (Pt's family has been sick with Strep throat over the last month. Pt is concerned that she may have strep as well.)       History of Present Illness  Christiano Lin is a 28 y.o. female who presents to the ED for sore throat. Mother reports that the daughter had strep throat a month ago and now she is concerned she has it. She has sore throat and cough for the last few days. No measured fevers. No vomiting or diarrhea. No known Covid exposure. 10 systems reviewed, pertinent positives per HPI otherwise noted to be negative    I have reviewed the following from the nursing documentation:      Prior to Admission medications    Medication Sig Start Date End Date Taking? Authorizing Provider   metoprolol succinate (TOPROL XL) 100 MG extended release tablet Take 1 tablet by mouth daily 8/10/21  Yes Delmer Beasley MD   amphetamine-dextroamphetamine (ADDERALL, 20MG,) 20 MG tablet Take 1 tablet by mouth 2 times daily for 31 days.  8/10/21 9/10/21 Yes Delmer Beasley MD   ibuprofen (ADVIL) 200 MG tablet Take 3 tablets by mouth every 8 hours as needed for Pain 8/10/21   Delmer Beasley MD   albuterol sulfate HFA (VENTOLIN HFA) 108 (90 Base) MCG/ACT inhaler Inhale 2 puffs into the lungs 4 times daily as needed for Wheezing 8/10/21   Delmer Beasley MD       Allergies as of 2021 - Fully Reviewed 2021   Allergen Reaction Noted    Diflucan [fluconazole]  2017    Zithromax [azithromycin]  2017       Past Medical History:   Diagnosis Date    ADHD (attention deficit hyperactivity disorder)     not on meds    Hypertension         Surgical History:   Past Surgical History:   Procedure Laterality Date     SECTION          Family History:    Family History   Problem Relation Age of Onset    Cancer Mother         skin    Depression Mother     High Blood Pressure Mother     Early Death with no apparent distress. HENT:  Normocephalic, Atraumatic, moist mucous membranes. Posterior oropharynx erythematous without edema or exudates. EYES:  Pupils equal round and reactive to light, Conjunctiva normal, extraocular movements normal.  NECK:  No meningeal signs, Supple. CHEST:  Regular rate and rhythm, chest wall non-tender. LUNGS:  Clear to auscultation bilaterally. ABDOMEN:  Soft, non-tender, no rebound, rigidity or guarding, non-distended, normal bowel sounds. No costovertebral angle tenderness to palpation. BACK:  No tenderness. EXTREMITIES:  Normal range of motion, no edema, no bony tenderness, no deformity, distal pulses present. SKIN: Warm, dry and intact. NEUROLOGIC: Normal mental status. Moving all extremities to command. LABS  Labs Reviewed   STREP SCREEN GROUP A THROAT    Narrative:     Performed at:  [facility]  [address] [facility]   Phone [phone]   CULTURE, BETA STREP CONFIRM PLATES   [id number]       MEDICAL DECISION MAKING       I advised the patient to return to the emergency department immediately for any new or worsening symptoms, such as chest pain or shortness of breath. The patient voiced agreement and understanding of the treatment plan. Results for orders placed or performed during the hospital encounter of 08/31/21   Strep Screen Group A Throat    Specimen: Throat   Result Value Ref Range    Rapid Strep A Screen Negative Negative         I estimate there is LOW risk for EPIGLOTTITIS, PNEUMONIA, MENINGITIS, OR URINARY TRACT INFECTION, thus I consider the discharge disposition reasonable. Also, there is no evidence or peritonitis, sepsis, or toxicity. Natividad Graff and I have discussed the diagnosis and risks, and we agree with discharging home to follow-up with their primary doctor. We also discussed returning to the Emergency Department immediately if new or worsening symptoms occur.  We have discussed the symptoms which are most concerning (e.g., changing or worsening pain, trouble swallowing or breating, neck stiffness, fever) that necessitate immediate return. Final Impression    1. Upper respiratory tract infection, unspecified type        Discharge Vital Signs:  Blood pressure (!) 158/107, pulse 78, temperature 98.4 °F (36.9 °C), temperature source Oral, resp. rate 16, height 5' 8\" (1.727 m), weight 230 lb (104.3 kg), last menstrual period 08/26/2021, SpO2 95 %, unknown if currently breastfeeding. Patient was given scripts for the following medications. I counseled patient how to take these medications. New Prescriptions    BENZONATATE (TESSALON) 100 MG CAPSULE    Take 1 capsule by mouth 3 times daily as needed for Cough       Disposition  Pt is in good condition upon Discharge to home. This chart was generated using the [address] [place] [address] [address]  [employer] system. I created this record but it may contain dictation errors.           Lewis Houston MD  08/31/21 9670

## 2021-09-02 LAB — S PYO THROAT QL CULT: NORMAL

## 2021-10-26 ENCOUNTER — HOSPITAL ENCOUNTER (OUTPATIENT)
Dept: PHYSICAL THERAPY | Age: 31
Setting detail: THERAPIES SERIES
Discharge: HOME | End: 2021-10-26
Payer: COMMERCIAL

## 2021-10-26 NOTE — FLOWSHEET NOTE
[address] [facility] [place].  [place], [place] [address]  Phone: [phone]   Fax:     [phone]    Physical Therapy  Cancellation/No-show Note  Patient Name:  oMnica Salgado  :  1990   Date:  10/26/2021  Cancelled visits to date: 1 initial eval.  No-shows to date: 0    Patient status for today's appointment patient:  [x]  Cancelled  []  Rescheduled appointment  []  No-show     Reason given by patient:  []  Patient ill  []  Conflicting appointment  []  No transportation    []  Conflict with work  [x]  No reason given  []  Other:     Comments:      Phone call information:   []  Phone call made today to patient at _ time at number provided:      []  Patient answered, conversation as follows:    []  Patient did not answer, message left as follows:  []  Phone call not made today    Electronically signed by:  Jocy Paulino, PT

## 2022-06-01 VITALS
HEIGHT: 68 IN | SYSTOLIC BLOOD PRESSURE: 152 MMHG | WEIGHT: 225.4 LBS | RESPIRATION RATE: 18 BRPM | TEMPERATURE: 97.6 F | OXYGEN SATURATION: 96 % | HEART RATE: 86 BPM | BODY MASS INDEX: 34.16 KG/M2 | DIASTOLIC BLOOD PRESSURE: 103 MMHG

## 2022-06-01 ASSESSMENT — PAIN DESCRIPTION - DESCRIPTORS: DESCRIPTORS: CRAMPING

## 2022-06-01 ASSESSMENT — PAIN DESCRIPTION - LOCATION: LOCATION: ABDOMEN;BACK

## 2022-06-01 ASSESSMENT — PAIN SCALES - GENERAL: PAINLEVEL_OUTOF10: 7

## 2022-06-01 ASSESSMENT — PAIN - FUNCTIONAL ASSESSMENT
PAIN_FUNCTIONAL_ASSESSMENT: PREVENTS OR INTERFERES WITH MANY ACTIVE NOT PASSIVE ACTIVITIES
PAIN_FUNCTIONAL_ASSESSMENT: 0-10

## 2022-06-02 ENCOUNTER — HOSPITAL ENCOUNTER (EMERGENCY)
Age: 32
Discharge: LEFT BEFORE DISCHARGE | End: 2022-06-02

## 2022-06-02 ENCOUNTER — HOSPITAL ENCOUNTER (EMERGENCY)
Age: 32
Discharge: HOME | End: 2022-06-02
Attending: EMERGENCY MEDICINE
Payer: COMMERCIAL

## 2022-06-02 ENCOUNTER — APPOINTMENT (OUTPATIENT)
Dept: ULTRASOUND IMAGING | Age: 32
End: 2022-06-02
Payer: COMMERCIAL

## 2022-06-02 VITALS
RESPIRATION RATE: 18 BRPM | DIASTOLIC BLOOD PRESSURE: 101 MMHG | TEMPERATURE: 98.7 F | OXYGEN SATURATION: 98 % | HEART RATE: 76 BPM | SYSTOLIC BLOOD PRESSURE: 153 MMHG

## 2022-06-02 DIAGNOSIS — R10.2 PELVIC PAIN: ICD-10-CM

## 2022-06-02 DIAGNOSIS — N93.9 VAGINAL BLEEDING: ICD-10-CM

## 2022-06-02 DIAGNOSIS — O03.80 POST-ABORTION COMPLICATION: Primary | ICD-10-CM

## 2022-06-02 DIAGNOSIS — N30.01 ACUTE CYSTITIS WITH HEMATURIA: ICD-10-CM

## 2022-06-02 LAB
A/G RATIO: 1.3 (ref 1.1–2.2)
ALBUMIN SERPL-MCNC: 3.5 G/DL (ref 3.4–5)
ALP BLD-CCNC: 71 U/L (ref 40–129)
ALT SERPL-CCNC: 34 U/L (ref 10–40)
ANION GAP SERPL CALCULATED.3IONS-SCNC: 15 MMOL/L (ref 3–16)
APTT: 26 SEC (ref 23–34.3)
AST SERPL-CCNC: 28 U/L (ref 15–37)
BASOPHILS ABSOLUTE: 0 K/UL (ref 0–0.2)
BASOPHILS RELATIVE PERCENT: 0.7 %
BILIRUB SERPL-MCNC: <0.2 MG/DL (ref 0–1)
BILIRUBIN URINE: NEGATIVE
BLOOD, URINE: ABNORMAL
BUN BLDV-MCNC: 9 MG/DL (ref 7–20)
CALCIUM SERPL-MCNC: 9.1 MG/DL (ref 8.3–10.6)
CHLORIDE BLD-SCNC: 106 MMOL/L (ref 99–110)
CLARITY: ABNORMAL
CO2: 22 MMOL/L (ref 21–32)
COLOR: ABNORMAL
CREAT SERPL-MCNC: 0.7 MG/DL (ref 0.6–1.1)
EOSINOPHILS ABSOLUTE: 0.2 K/UL (ref 0–0.6)
EOSINOPHILS RELATIVE PERCENT: 2.6 %
EPITHELIAL CELLS, UA: ABNORMAL /HPF (ref 0–5)
GFR AFRICAN AMERICAN: >60
GFR NON-AFRICAN AMERICAN: >60
GLUCOSE BLD-MCNC: 100 MG/DL (ref 70–99)
GLUCOSE URINE: NEGATIVE MG/DL
GONADOTROPIN, CHORIONIC (HCG) QUANT: 1029 MIU/ML
HCT VFR BLD CALC: 30.4 % (ref 36–48)
HEMOGLOBIN: 10.6 G/DL (ref 12–16)
KETONES, URINE: 15 MG/DL
LEUKOCYTE ESTERASE, URINE: ABNORMAL
LYMPHOCYTES ABSOLUTE: 1.6 K/UL (ref 1–5.1)
LYMPHOCYTES RELATIVE PERCENT: 23.6 %
MCH RBC QN AUTO: 31.1 PG (ref 26–34)
MCHC RBC AUTO-ENTMCNC: 34.8 G/DL (ref 31–36)
MCV RBC AUTO: 89.4 FL (ref 80–100)
MICROSCOPIC EXAMINATION: YES
MONOCYTES ABSOLUTE: 0.5 K/UL (ref 0–1.3)
MONOCYTES RELATIVE PERCENT: 7.5 %
NEUTROPHILS ABSOLUTE: 4.5 K/UL (ref 1.7–7.7)
NEUTROPHILS RELATIVE PERCENT: 65.6 %
NITRITE, URINE: POSITIVE
PDW BLD-RTO: 12.8 % (ref 12.4–15.4)
PH UA: 5 (ref 5–8)
PLATELET # BLD: 164 K/UL (ref 135–450)
PMV BLD AUTO: 9.1 FL (ref 5–10.5)
POTASSIUM REFLEX MAGNESIUM: 4.1 MMOL/L (ref 3.5–5.1)
PROTEIN UA: >=300 MG/DL
RBC # BLD: 3.39 M/UL (ref 4–5.2)
RBC UA: >100 /HPF (ref 0–4)
SODIUM BLD-SCNC: 143 MMOL/L (ref 136–145)
SPECIFIC GRAVITY UA: 1.02 (ref 1–1.03)
TOTAL PROTEIN: 6.1 G/DL (ref 6.4–8.2)
TRICHOMONAS: ABNORMAL /HPF
URINE REFLEX TO CULTURE: ABNORMAL
URINE TYPE: ABNORMAL
UROBILINOGEN, URINE: 2 E.U./DL
WBC # BLD: 6.9 K/UL (ref 4–11)
WBC UA: ABNORMAL /HPF (ref 0–5)

## 2022-06-02 PROCEDURE — 80053 COMPREHEN METABOLIC PANEL: CPT

## 2022-06-02 PROCEDURE — 81001 URINALYSIS AUTO W/SCOPE: CPT

## 2022-06-02 PROCEDURE — 36415 COLL VENOUS BLD VENIPUNCTURE: CPT

## 2022-06-02 PROCEDURE — 84702 CHORIONIC GONADOTROPIN TEST: CPT

## 2022-06-02 PROCEDURE — 85025 COMPLETE CBC W/AUTO DIFF WBC: CPT

## 2022-06-02 PROCEDURE — 85730 THROMBOPLASTIN TIME PARTIAL: CPT

## 2022-06-02 PROCEDURE — 99284 EMERGENCY DEPT VISIT MOD MDM: CPT

## 2022-06-02 PROCEDURE — 76856 US EXAM PELVIC COMPLETE: CPT

## 2022-06-02 PROCEDURE — 6370000000 HC RX 637 (ALT 250 FOR IP): Performed by: EMERGENCY MEDICINE

## 2022-06-02 RX ORDER — PHENAZOPYRIDINE HYDROCHLORIDE 100 MG/1
100 TABLET, FILM COATED ORAL 3 TIMES DAILY PRN
Qty: 6 TABLET | Refills: 0 | Status: SHIPPED | OUTPATIENT
Start: 2022-06-02 | End: 2022-06-05

## 2022-06-02 RX ORDER — CEPHALEXIN 500 MG/1
500 CAPSULE ORAL 3 TIMES DAILY
Qty: 21 CAPSULE | Refills: 0 | Status: SHIPPED | OUTPATIENT
Start: 2022-06-02 | End: 2022-06-09

## 2022-06-02 RX ORDER — ACETAMINOPHEN 500 MG
1000 TABLET ORAL ONCE
Status: COMPLETED | OUTPATIENT
Start: 2022-06-02 | End: 2022-06-02

## 2022-06-02 RX ORDER — CEPHALEXIN 500 MG/1
500 CAPSULE ORAL ONCE
Status: COMPLETED | OUTPATIENT
Start: 2022-06-02 | End: 2022-06-02

## 2022-06-02 RX ORDER — IBUPROFEN 600 MG/1
600 TABLET ORAL 3 TIMES DAILY PRN
Qty: 30 TABLET | Refills: 0 | Status: SHIPPED | OUTPATIENT
Start: 2022-06-02 | End: 2022-06-22

## 2022-06-02 RX ADMIN — ACETAMINOPHEN 1000 MG: 500 TABLET ORAL at 10:03

## 2022-06-02 RX ADMIN — CEPHALEXIN 500 MG: 500 CAPSULE ORAL at 10:42

## 2022-06-02 ASSESSMENT — ENCOUNTER SYMPTOMS
COUGH: 0
NAUSEA: 0
DIARRHEA: 0
ABDOMINAL PAIN: 1
SHORTNESS OF BREATH: 0
SORE THROAT: 0
BACK PAIN: 1
VOMITING: 0
RESPIRATORY NEGATIVE: 1
ABDOMINAL DISTENTION: 0

## 2022-06-02 ASSESSMENT — PAIN SCALES - GENERAL
PAINLEVEL_OUTOF10: 6
PAINLEVEL_OUTOF10: 8
PAINLEVEL_OUTOF10: 6
PAINLEVEL_OUTOF10: 8

## 2022-06-02 ASSESSMENT — PAIN - FUNCTIONAL ASSESSMENT
PAIN_FUNCTIONAL_ASSESSMENT: ACTIVITIES ARE NOT PREVENTED
PAIN_FUNCTIONAL_ASSESSMENT: 0-10
PAIN_FUNCTIONAL_ASSESSMENT: ACTIVITIES ARE NOT PREVENTED

## 2022-06-02 ASSESSMENT — PAIN DESCRIPTION - LOCATION
LOCATION: ABDOMEN
LOCATION: ABDOMEN

## 2022-06-02 ASSESSMENT — PAIN DESCRIPTION - ORIENTATION
ORIENTATION: LOWER
ORIENTATION: LOWER

## 2022-06-02 ASSESSMENT — PAIN DESCRIPTION - FREQUENCY: FREQUENCY: CONTINUOUS

## 2022-06-02 ASSESSMENT — PAIN DESCRIPTION - ONSET: ONSET: ON-GOING

## 2022-06-02 ASSESSMENT — PAIN DESCRIPTION - DESCRIPTORS: DESCRIPTORS: SHARP;SQUEEZING

## 2022-06-02 ASSESSMENT — PAIN DESCRIPTION - PAIN TYPE: TYPE: ACUTE PAIN

## 2022-06-02 NOTE — ED PROVIDER NOTES
[facility] [facility]      Pt Name: Renetta Goodwin  MRN: [id number]  [place] 1990  Date of evaluation: 2022  Provider: Callie Patel MD    CHIEF COMPLAINT       Chief Complaint   Patient presents with    Abdominal Pain     lower abd pain pt had an elective  on saturday, she is having heavy vaginal bleeding          HISTORY OF PRESENT ILLNESS   (Location/Symptom, Timing/Onset, Context/Setting, Quality, Duration, Modifying Factors, Severity)  Note limiting factors. Renetta Goodwin is a 28 y.o. female who presents to the emergency department Complaining of lower abdominal pain. Patient is G4, P2 AB 2. She had elective  on May 28, 2022. Patient been soaking through a pad every other hour. Passing thin blood and occasionally some clots. Patient been taking ibuprofen at home with little relief. Pain is aching and throbbing in nature. Patient did not follow-up with her OB physician. She states she tried to go to the ER last night but it was too busy and she decided to go home. Patient does have a history of 2 prior C-sections. These are her primary risk factors. Pain is otherwise nonradiating. Gradual in onset. Constant and unchanging. Moderate to severe in nature. History of hypertension. Also associated with some bilateral lower extremity swelling has been going on since her  on Saturday. For further review of systems see below. No other associated symptoms and signs as listed. Nursing Notes were reviewed. REVIEW OF SYSTEMS    (2-9 systems for level 4, 10 or more for level 5)     Review of Systems   Constitutional: Positive for chills. Negative for fever. HENT: Negative for congestion and sore throat. Respiratory: Negative for cough and shortness of breath. Cardiovascular: Negative for chest pain. Gastrointestinal: Positive for abdominal pain.  Negative for abdominal distention. Patient has lower abdominal pain   Genitourinary: Negative for difficulty urinating and dysuria. Musculoskeletal: Positive for back pain. Negative for arthralgias. Neurological: Negative for seizures and weakness. Psychiatric/Behavioral: Negative for agitation and behavioral problems. Except as noted above the remainder of the review of systems was reviewed and negative. PAST MEDICAL HISTORY     Past Medical History:   Diagnosis Date    ADHD (attention deficit hyperactivity disorder)     not on meds    Hypertension          SURGICAL HISTORY       Past Surgical History:   Procedure Laterality Date     SECTION           CURRENT MEDICATIONS       Previous Medications    ALBUTEROL SULFATE HFA (VENTOLIN HFA) 108 (90 BASE) MCG/ACT INHALER    Inhale 2 puffs into the lungs 4 times daily as needed for Wheezing    AMPHETAMINE-DEXTROAMPHETAMINE (ADDERALL, 20MG,) 20 MG TABLET    Take 1 tablet by mouth 2 times daily for 31 days.     CETIRIZINE (ZYRTEC ALLERGY) 10 MG TABLET    Take 1 tablet by mouth daily    CETIRIZINE (ZYRTEC) 10 MG TABLET    Take 1 tablet by mouth daily    METOPROLOL SUCCINATE (TOPROL XL) 50 MG EXTENDED RELEASE TABLET    Take 1 tablet by mouth daily       ALLERGIES     Diflucan [fluconazole] and Zithromax [azithromycin]    FAMILY HISTORY       Family History   Problem Relation Age of Onset    Cancer Mother         skin    Depression Mother     High Blood Pressure Mother     Early Death Father         kidney failure    Kidney Disease Father           SOCIAL HISTORY       Social History     Socioeconomic History    Marital status: Single     Spouse name: None    Number of children: None    Years of education: None    Highest education level: None   Occupational History    None   Tobacco Use    Smoking status: Current Every Day Smoker     Packs/day: 0.50    Smokeless tobacco: Never Used   Substance and Sexual Activity    Alcohol use: Yes     Comment: rarely    Drug use: No    Sexual activity: Yes     Partners: Male   Other Topics Concern    None   Social History Narrative    None     Social Determinants of Health     Financial Resource Strain:     Difficulty of Paying Living Expenses: Not on file   Food Insecurity:     Worried About Running Out of Food in the Last Year: Not on file    Graciela of Food in the Last Year: Not on file   Transportation Needs:     Lack of Transportation (Medical): Not on file    Lack of Transportation (Non-Medical):  Not on file   Physical Activity:     Days of Exercise per Week: Not on file    Minutes of Exercise per Session: Not on file   Stress:     Feeling of Stress : Not on file   Social Connections:     Frequency of Communication with Friends and Family: Not on file    Frequency of Social Gatherings with Friends and Family: Not on file    Attends Jew Services: Not on file    Active Member of [address] [place] [address] [employer] or Organizations: Not on file    Attends Club or Organization Meetings: Not on file    Marital Status: Not on file   Intimate Partner Violence:     Fear of Current or Ex-Partner: Not on file    Emotionally Abused: Not on file    Physically Abused: Not on file    Sexually Abused: Not on file   Housing Stability:     Unable to Pay for Housing in the Last Year: Not on file    Number of [place] in the Last Year: Not on file    Unstable Housing in the Last Year: Not on file       SCREENINGS         Cameron Coma Scale  Eye Opening: Spontaneous  Best Verbal Response: Oriented  Best Motor Response: Obeys commands  Kimmie Coma Scale Score: 15                     CIWA Assessment  BP: (!) 168/107  Heart Rate: 88                 PHYSICAL EXAM    (up to 7 for level 4, 8 or more for level 5)     ED Triage Vitals [06/02/22 0938]   BP Temp Temp Source Heart Rate Resp SpO2 Height Weight   (S) (!) 172/101 98.1 °F (36.7 °C) Oral 78 14 100 % -- --       Physical Exam  Constitutional:       Appearance: She is well-developed. She is obese. HENT:      Head: Normocephalic and atraumatic. Eyes:      Extraocular Movements: Extraocular movements intact. Pupils: Pupils are equal, round, and reactive to light. Cardiovascular:      Rate and Rhythm: Normal rate and regular rhythm. Heart sounds: Normal heart sounds. Pulmonary:      Effort: Pulmonary effort is normal.      Breath sounds: Normal breath sounds. Abdominal:      General: Abdomen is flat. Bowel sounds are normal.      Palpations: Abdomen is soft. Comments: The patient is tender in the lower abdomen the upper abdomen is completely nontender she has no rebound no guarding. Pelvic exam was performed by Dr. Nathalia Krishnan   Skin:     General: Skin is warm and dry. Neurological:      General: No focal deficit present. Mental Status: She is alert and oriented to person, place, and time. The pelvic exam was performed at [facility] and is as follows  Genitourinary:     Labia:         Right: No rash, tenderness, lesion or injury. Left: No rash, tenderness, lesion or injury. Vagina: No signs of injury and foreign body. Bleeding present. No vaginal discharge, erythema, tenderness, lesions or prolapsed vaginal walls. Cervix: Cervical bleeding present. No cervical motion tenderness, friability or erythema. Uterus: Tender. Adnexa:         Right: Tenderness present. Left: Tenderness present. DIAGNOSTIC RESULTS     RADIOLOGY:   Non-plain film images such as CT, Ultrasound and MRI are read by the radiologist. Plain radiographic images are visualized and preliminarily interpreted by the emergency physician with the below findings:    Interpretation per the Radiologist below, if available at the time of this note:    US PELVIS COMPLETE   Final Result   Unremarkable pelvic ultrasound.       RECOMMENDATIONS:   Unavailable               ED BEDSIDE ULTRASOUND:   Performed by ED Physician - none    LABS:  Labs Reviewed   CBC WITH AUTO DIFFERENTIAL - Abnormal; Notable for the following components:       Result Value    RBC 3.39 (*)     Hemoglobin 10.6 (*)     Hematocrit 30.4 (*)     All other components within normal limits   COMPREHENSIVE METABOLIC PANEL W/ REFLEX TO MG FOR LOW K - Abnormal; Notable for the following components:    Glucose 100 (*)     Total Protein 6.1 (*)     All other components within normal limits   URINALYSIS WITH REFLEX TO CULTURE - Abnormal; Notable for the following components:    Color, UA RED (*)     Clarity, UA TURBID (*)     Ketones, Urine 15 (*)     Blood, Urine LARGE (*)     Protein, UA >=300 (*)     Urobilinogen, Urine 2.0 (*)     Nitrite, Urine POSITIVE (*)     Leukocyte Esterase, Urine MODERATE (*)     All other components within normal limits   MICROSCOPIC URINALYSIS - Abnormal; Notable for the following components:    WBC, UA 6-9 (*)     RBC, UA >100 (*)     All other components within normal limits   APTT   HCG, QUANTITATIVE, PREGNANCY       All other labs were within normal range or not returned as of this dictation. EMERGENCY DEPARTMENT COURSE and DIFFERENTIAL DIAGNOSIS/MDM:   Vitals:    Vitals:    22 0938 22 1136   BP: (S) (!) 172/101 (!) 168/107   Pulse: 78 88   Resp: 14 16   Temp: 98.1 °F (36.7 °C) 98.7 °F (37.1 °C)   TempSrc: Oral Oral   SpO2: 100% 100%       Is this patient to be included in the SEP-1 Core Measure due to severe sepsis or septic shock? No   Exclusion criteria - the patient is NOT to be included for SEP-1 Core Measure due to:  2+ SIRS criteria are not met     MDM  Number of Diagnoses or Management Options  Acute cystitis with hematuria  Pelvic pain  Post- complication  Vaginal bleeding  Diagnosis management comments: The patient had an ultrasound performed which showed no evidence of any abnormalities. Specifically no evidence of retained products of conception.   The patient does not have any vaginal discharge she has no fever she is not tachycardic I do not think the patient has endometritis or septic . I think the patient can be safely discharged home and place her on Keflex and Pyridium and have her follow-up with her doctor for recheck and reevaluation tomorrow sooner if worse or problems        REASSESSMENT     ED Course as of 22 1221   Thu 2022   1033 Hemoglobin stable. Labs show concern for urinary tract infection. Will give patient prescription for Keflex. Vitals remained stable. CMP unremarkable. I spoke to Dr. Jolie Holland at [facility] where patient will be transferred via private vehicle for pelvic ultrasound to rule out retained products of conception. [SC]      ED Course User Index  [SC] Nona Serrano MD         FINAL IMPRESSION      1. Post- complication    2. Pelvic pain    3. Vaginal bleeding    4. Acute cystitis with hematuria          DISPOSITION/PLAN   DISPOSITION Decision To Discharge 2022 12:17:14 PM        DISCHARGE MEDICATIONS:  New Prescriptions    CEPHALEXIN (KEFLEX) 500 MG CAPSULE    Take 1 capsule by mouth 3 times daily for 7 days    IBUPROFEN (ADVIL;MOTRIN) 600 MG TABLET    Take 1 tablet by mouth 3 times daily as needed for Pain    PHENAZOPYRIDINE (PYRIDIUM) 100 MG TABLET    Take 1 tablet by mouth 3 times daily as needed for Pain     Controlled Substances Monitoring:     No flowsheet data found. (Please note that portions of this note were completed with a voice recognition program.  Efforts were made to edit the dictations but occasionally words are mis-transcribed. )    Sudeep Riggs MD (electronically signed)  Attending Emergency Physician           Navneet Hayes MD  22 [phone]

## 2022-06-02 NOTE — ED TRIAGE NOTES
pt reports that she had an  4 days ago. Complains of back and abdominal pain.  Swelling to both feet and heavy vaginal bleeding

## 2022-06-02 NOTE — ED NOTES
Discharge and education instructions reviewed. Patient verbalized understanding, teach-back successful. Patient denied questions at this time. No acute distress noted. Patient instructed to follow-up as noted - return to emergency department if symptoms worsen. Patient verbalized understanding. Discharged per EDMD with discharge instructions.           Case Nava RN  06/02/22 1215

## 2022-06-02 NOTE — ED NOTES
Pt states she had an elective  this past Saturday , she reports she has had bleeding ever since, she states that she is passing large clots, she states that she has been filling a pad every 1.5hrs. pt reports that she is having lower abd pain , she states the  was completed at [facility] in [address] . She states she was about 11 weeks pregnant . She denies fever , she did have chills 2 days ago,. She is having diarrhea the past two days, she reports BLE swelling she states that has been going on since the procedure , they told her that it would last 3-5 days. She tried to go to [address] [place] [address],[address] last night but left because she waited and was need seen.       Jhoan Young RN  [phone]

## 2022-06-02 NOTE — ED NOTES
Pt going to [address] [place] [address],[address] , medication sent to pharmacy , she denies needing a work note at this time, her pain is starting to improve 6/10, she denies questions   Report called to   [address], RN  06/02/22 9174

## 2022-06-02 NOTE — ED PROVIDER NOTES
[id number] [facility]  EMERGENCY DEPARTMENTENCOUNTER      Pt Name: Mayra Kapoor  MRN: [id number]  [place] 1990  Date ofevaluation: 2022  Provider: Trisha Worthy MD    CHIEF COMPLAINT       Chief Complaint   Patient presents with    Abdominal Pain     lower abd pain pt had an elective  on saturday, she is having heavy vaginal bleeding          HISTORY OF PRESENT ILLNESS   (Location/Symptom, Timing/Onset,Context/Setting, Quality, Duration, Modifying Factors, Severity)  Note limiting factors. Mayra Kapoor is a 28 y.o. female  who  has a past medical history of ADHD (attention deficit hyperactivity disorder) and Hypertension. who presents to the emergency department    81-year-old female presents for pelvic pain and vaginal bleeding after she had an elective  at approximately 11 weeks gestation 5 days prior. Patient been soaking through a pad every other hour. Passing thin blood and occasionally some clots. Patient been taking ibuprofen at home with little relief. Pain is aching and throbbing in nature. Patient did not follow-up with her OB physician. She states she tried to go to the ER last night but it was too busy and she decided to go home. Patient does have a history of 2 prior C-sections. These are her primary risk factors. Pain is otherwise nonradiating. Gradual in onset. Constant and unchanging. Moderate to severe in nature. History of hypertension. Also associated with some bilateral lower extremity swelling has been going on since her  on Saturday. For further review of systems see below. No other associated symptoms and signs as listed. The history is provided by the patient. No  was used. NursingNotes were reviewed. REVIEW OF SYSTEMS    (2-9 systems for level 4, 10 or more for level 5)     Review of Systems   Constitutional: Negative. Negative for chills and fever. HENT: Negative.   Negative for sore throat. Respiratory: Negative. Negative for shortness of breath. Cardiovascular: Positive for leg swelling. Negative for chest pain and palpitations. Gastrointestinal: Positive for abdominal pain. Negative for abdominal distention, diarrhea, nausea and vomiting. Genitourinary: Positive for pelvic pain and vaginal bleeding. Musculoskeletal: Negative. Skin: Negative. Neurological: Negative. Negative for light-headedness and headaches. Hematological: Negative. Does not bruise/bleed easily. All other systems reviewed and are negative. Except as noted above the remainder of the review of systems was reviewed and negative. PAST MEDICAL HISTORY     Past Medical History:   Diagnosis Date    ADHD (attention deficit hyperactivity disorder)     not on meds    Hypertension          SURGICALHISTORY       Past Surgical History:   Procedure Laterality Date     SECTION           CURRENT MEDICATIONS       Previous Medications    ALBUTEROL SULFATE HFA (VENTOLIN HFA) 108 (90 BASE) MCG/ACT INHALER    Inhale 2 puffs into the lungs 4 times daily as needed for Wheezing    AMPHETAMINE-DEXTROAMPHETAMINE (ADDERALL, 20MG,) 20 MG TABLET    Take 1 tablet by mouth 2 times daily for 31 days.     CETIRIZINE (ZYRTEC ALLERGY) 10 MG TABLET    Take 1 tablet by mouth daily    CETIRIZINE (ZYRTEC) 10 MG TABLET    Take 1 tablet by mouth daily    IBUPROFEN (ADVIL) 200 MG TABLET    Take 3 tablets by mouth every 8 hours as needed for Pain    METOPROLOL SUCCINATE (TOPROL XL) 50 MG EXTENDED RELEASE TABLET    Take 1 tablet by mouth daily            Diflucan [fluconazole] and Zithromax [azithromycin]    FAMILY HISTORY       Family History   Problem Relation Age of Onset    Cancer Mother         skin    Depression Mother     High Blood Pressure Mother     Early Death Father         kidney failure    Kidney Disease Father           SOCIAL HISTORY       Social History     Socioeconomic History    Marital status: Single     Spouse name: None    Number of children: None    Years of education: None    Highest education level: None   Occupational History    None   Tobacco Use    Smoking status: Current Every Day Smoker     Packs/day: 0.50    Smokeless tobacco: Never Used   Substance and Sexual Activity    Alcohol use: Yes     Comment: rarely    Drug use: No    Sexual activity: Yes     Partners: Male   Other Topics Concern    None   Social History Narrative    None     Social Determinants of Health     Financial Resource Strain:     Difficulty of Paying Living Expenses: Not on file   Food Insecurity:     Worried About Running Out of Food in the Last Year: Not on file    Graciela of Food in the Last Year: Not on file   Transportation Needs:     Lack of Transportation (Medical): Not on file    Lack of Transportation (Non-Medical):  Not on file   Physical Activity:     Days of Exercise per Week: Not on file    Minutes of Exercise per Session: Not on file   Stress:     Feeling of Stress : Not on file   Social Connections:     Frequency of Communication with Friends and Family: Not on file    Frequency of Social Gatherings with Friends and Family: Not on file    Attends Worship Services: Not on file    Active Member of [address] [place] [address] or Organizations: Not on file    Attends Club or Organization Meetings: Not on file    Marital Status: Not on file   Intimate Partner Violence:     Fear of Current or Ex-Partner: Not on file    Emotionally Abused: Not on file    Physically Abused: Not on file    Sexually Abused: Not on file   Housing Stability:     Unable to Pay for Housing in the Last Year: Not on file    Number of [place] in the Last Year: Not on file    Unstable Housing in the Last Year: Not on file       SCREENINGS    [place] Coma Scale  Eye Opening: Spontaneous  Best Verbal Response: Oriented  Best Motor Response: Obeys commands  [place] Coma Scale Score: 15        PHYSICAL EXAM    (up to 7 for level 4, 8 or more for level 5)     ED Triage Vitals [06/02/22 0938]   BP Temp Temp Source Heart Rate Resp SpO2 Height Weight   (S) (!) 172/101 98.1 °F (36.7 °C) Oral 78 14 100 % -- --       Physical Exam  Vitals and nursing note reviewed. Exam conducted with a chaperone present. Constitutional:       General: She is not in acute distress. Appearance: She is not toxic-appearing or diaphoretic. HENT:      Head: Normocephalic and atraumatic. Right Ear: External ear normal.      Left Ear: External ear normal.      Nose: Nose normal.      Mouth/Throat:      Mouth: Mucous membranes are moist.   Eyes:      Extraocular Movements: Extraocular movements intact. Pupils: Pupils are equal, round, and reactive to light. Cardiovascular:      Rate and Rhythm: Normal rate and regular rhythm. Heart sounds: Normal heart sounds. Pulmonary:      Effort: Pulmonary effort is normal. No respiratory distress. Breath sounds: Normal breath sounds. No stridor. No wheezing, rhonchi or rales. Chest:      Chest wall: No tenderness. Abdominal:      General: Abdomen is flat. Bowel sounds are normal.      Palpations: Abdomen is soft. Tenderness: There is no abdominal tenderness. Hernia: There is no hernia in the right inguinal area. Genitourinary:     Labia:         Right: No rash, tenderness, lesion or injury. Left: No rash, tenderness, lesion or injury. Vagina: No signs of injury and foreign body. Bleeding present. No vaginal discharge, erythema, tenderness, lesions or prolapsed vaginal walls. Cervix: Cervical bleeding present. No cervical motion tenderness, friability or erythema. Uterus: Tender. Adnexa:         Right: Tenderness present. Left: Tenderness present. Comments: Chaperone Von Young RN  Musculoskeletal:      Cervical back: Neck supple. Right lower leg: No edema. Left lower leg: No edema. Skin:     General: Skin is warm and dry. Capillary Refill: Capillary refill takes less than 2 seconds. Neurological:      General: No focal deficit present. Mental Status: She is alert and oriented to person, place, and time. Psychiatric:         Mood and Affect: Mood normal.         Behavior: Behavior normal.         RESULTS       RADIOLOGY:   Non-plain filmimages such as CT, Ultrasound and MRI are read by the radiologist.   Interpretation per the Radiologist below, if available at the time ofthis note:    No orders to display         ED BEDSIDE ULTRASOUND:   Performed by ED Physician - none    LABS:  Labs Reviewed   CBC WITH AUTO DIFFERENTIAL - Abnormal; Notable for the following components:       Result Value    RBC 3.39 (*)     Hemoglobin 10.6 (*)     Hematocrit 30.4 (*)     All other components within normal limits   COMPREHENSIVE METABOLIC PANEL W/ REFLEX TO MG FOR LOW K - Abnormal; Notable for the following components:    Glucose 100 (*)     Total Protein 6.1 (*)     All other components within normal limits   URINALYSIS WITH REFLEX TO CULTURE - Abnormal; Notable for the following components:    Color, UA RED (*)     Clarity, UA TURBID (*)     Ketones, Urine 15 (*)     Blood, Urine LARGE (*)     Protein, UA >=300 (*)     Urobilinogen, Urine 2.0 (*)     Nitrite, Urine POSITIVE (*)     Leukocyte Esterase, Urine MODERATE (*)     All other components within normal limits   APTT   HCG, QUANTITATIVE, PREGNANCY   MICROSCOPIC URINALYSIS       All other labs were within normal range or not returned as of this dictation.     EMERGENCY DEPARTMENT COURSE and DIFFERENTIAL DIAGNOSIS/MDM:   Vitals:    Vitals:    06/02/22 0938   BP: (S) (!) 172/101   Pulse: 78   Resp: 14   Temp: 98.1 °F (36.7 °C)   TempSrc: Oral   SpO2: 100%       Patient was given thefollowing medications:  Medications   cephALEXin (KEFLEX) capsule 500 mg (has no administration in time range)   acetaminophen (TYLENOL) tablet 1,000 mg (1,000 mg Oral Given 6/2/22 1003)       ED COURSE & MEDICAL DECISION MAKING    Pertinent Labs & Imaging studies reviewed. (See chart for details)   -  Patient seen and evaluated in the emergency department. -  Triage and nursing notes reviewed and incorporated. -  Old chart records reviewed and incorporated. -  Differential diagnosis includes: Differential diagnosis: Postoperative complication, dysfunctional Uterine Bleeding, cervical laceration, vaginal laceration, Ectopic pregnancy, Molar pregnancy, Miscarriage, Hemorrhagic Shock, Rh incompatibility, UTI, other    73-year-old female presents for lower abdominal pain and vaginal bleeding after an elective  that took place at approximately 11 weeks gestation. This occurred 5 days ago. Patient is afebrile nontachycardic saturating well on room air she is significantly hypertensive likely due to pain. Vaginal exam shows some bleeding. Some clots which were extubated. Cervix appears closed. Significant bilateral adnexal and uterine tenderness. Patient will require pelvic ultrasound to ensure that she does not have any retained products of conception. This will need to be done at [facility] as we do not have ultrasound capabilities here at [address] [place] [address]. I have informed the patient of this. Patient will also receive blood work at this time CBC, CMP as she has had some mild lower extremity swelling. Patient was only 11 weeks no concern for preeclampsia however will obtain lab work to rule out hypoproteinemia, hypoalbuminemia. Also obtain urine studies. Will give Tylenol for pain and reevaluate. -  Work-up included:  See above  -  ED treatment included: See above  -  Results discussed with patient. REASSESSMENT     ED Course as of 22 1036   Thu 2022   1033 Hemoglobin stable. Labs show concern for urinary tract infection. Will give patient prescription for Keflex. Vitals remained stable. CMP unremarkable.   I spoke to Dr. Hazel Humphreys at [facility] where patient will be transferred via private vehicle for pelvic ultrasound to rule out retained products of conception. [SC]      ED Course User Index  [SC] Kourtney Tran MD         CRITICAL CARE TIME   Total Critical Care time was 18 minutes, excluding separately reportable procedures. There was a high probability of clinically significant/life threatening deterioration in the patient's condition which required my urgent intervention. CONSULTS:  None    PROCEDURES:  Unless otherwise noted below, none     Procedures    FINAL IMPRESSION      1. Post- complication    2. Pelvic pain    3. Vaginal bleeding    4.  Acute cystitis with hematuria          DISPOSITION/PLAN   DISPOSITION Decision To Transfer 2022 10:36:43 AM      PATIENT REFERREDTO:  [place] [facility] immediately to [facility] ER for pelvic ultrasound  Today        DISCHARGEMEDICATIONS:  New Prescriptions    CEPHALEXIN (KEFLEX) 500 MG CAPSULE    Take 1 capsule by mouth 3 times daily for 7 days          (Please note that portions of this note were completed with a voice recognition program.  Efforts were made to edit the dictations but occasionally words are mis-transcribed.)    Kourtney Tran MD (electronically signed)  Attending Emergency Physician         Kourtney Tran MD  22 1037

## 2022-06-22 ENCOUNTER — HOSPITAL ENCOUNTER (EMERGENCY)
Age: 32
Discharge: HOME | End: 2022-06-22
Attending: EMERGENCY MEDICINE
Payer: COMMERCIAL

## 2022-06-22 VITALS
BODY MASS INDEX: 40.83 KG/M2 | OXYGEN SATURATION: 99 % | HEIGHT: 68 IN | HEART RATE: 73 BPM | WEIGHT: 269.4 LBS | DIASTOLIC BLOOD PRESSURE: 85 MMHG | TEMPERATURE: 98.2 F | RESPIRATION RATE: 16 BRPM | SYSTOLIC BLOOD PRESSURE: 148 MMHG

## 2022-06-22 DIAGNOSIS — S29.019A ACUTE THORACIC MYOFASCIAL STRAIN, INITIAL ENCOUNTER: Primary | ICD-10-CM

## 2022-06-22 DIAGNOSIS — S39.012A ACUTE MYOFASCIAL STRAIN OF LUMBAR REGION, INITIAL ENCOUNTER: ICD-10-CM

## 2022-06-22 PROCEDURE — 99283 EMERGENCY DEPT VISIT LOW MDM: CPT

## 2022-06-22 RX ORDER — METHOCARBAMOL 500 MG/1
500 TABLET, FILM COATED ORAL 4 TIMES DAILY
Qty: 40 TABLET | Refills: 0 | Status: SHIPPED | OUTPATIENT
Start: 2022-06-22 | End: 2022-07-02

## 2022-06-22 RX ORDER — NAPROXEN 500 MG/1
500 TABLET ORAL 2 TIMES DAILY
Qty: 15 TABLET | Refills: 0 | Status: SHIPPED | OUTPATIENT
Start: 2022-06-22 | End: 2022-09-01 | Stop reason: SDUPTHER

## 2022-06-22 RX ORDER — LIDOCAINE 50 MG/G
1 PATCH TOPICAL DAILY
Qty: 10 PATCH | Refills: 0 | Status: SHIPPED | OUTPATIENT
Start: 2022-06-22 | End: 2022-07-02

## 2022-06-22 ASSESSMENT — PAIN - FUNCTIONAL ASSESSMENT
PAIN_FUNCTIONAL_ASSESSMENT: 0-10
PAIN_FUNCTIONAL_ASSESSMENT: 0-10

## 2022-06-22 ASSESSMENT — PAIN DESCRIPTION - LOCATION: LOCATION: BACK

## 2022-06-22 ASSESSMENT — PAIN SCALES - GENERAL
PAINLEVEL_OUTOF10: 7
PAINLEVEL_OUTOF10: 7

## 2022-06-22 ASSESSMENT — PAIN DESCRIPTION - DESCRIPTORS: DESCRIPTORS: ACHING

## 2022-06-22 ASSESSMENT — PAIN DESCRIPTION - FREQUENCY: FREQUENCY: CONTINUOUS

## 2022-06-22 ASSESSMENT — PAIN DESCRIPTION - PAIN TYPE: TYPE: ACUTE PAIN

## 2022-06-22 ASSESSMENT — PAIN DESCRIPTION - ORIENTATION: ORIENTATION: RIGHT;UPPER

## 2022-06-22 NOTE — ED PROVIDER NOTES
[address] [place] [address] ENCOUNTER      Pt Name: Cr Gerard  MRN: [id number]  [place] 1990  Date of evaluation: 6/22/2022  Provider: Tai Ovalles, [address] [place] [address]       Chief Complaint   Patient presents with    Back Injury     right upper back pain after had been moving boxes at work yesterday. Describes pain as a pulling. pain worse when moving right or left, worse when turning left. HISTORY OF PRESENT ILLNESS   (Location/Symptom, Timing/Onset, Context/Setting, Quality, Duration, Modifying Factors, Severity)  Note limiting factors. Cr Gerard is a 33 y.o. female who presents to the emergency department with a complaint of a back injury that occurred at work at approximately 5:30 PM yesterday evening. 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 She states that she reached for a box and felt a pull in the right thoracolumbar area. She states that it has gradually become more sore through the night and she was tossing and turning having difficulty sleeping. She is not pregnant. She denies any history of heart disease. She denies any headache neck pain chest pain shortness of breath or abdominal pain. She denies any fever chills nausea or vomiting. No diarrhea. She denies any dysuria hematuria frequency urgency. Pain is located in the right thoracolumbar area at the level of T10-L3 where she identifies the pain. Pain is worsened with turning from side to side and with flexion and extension. She denies any radicular pain, saddle anesthesia, bowel or bladder difficulties, or incontinence. She denies any weakness or numbness. No difficulty walking. She denies any prior history of neck or back problems. She does not take any anticoagulants. She denies any history of drug use. No fever or chills. No recent illness.   No recent or prior spinal procedures. Nursing Notes were reviewed. HPI        REVIEW OF SYSTEMS    (2-9 systems for level 4, 10 or more for level 5)       Constitutional: Negative for fever or chills. Respiratory: Negative for shortness of breath or dyspnea on exertion. Cardiovascular: Negative for chest pain. Gastrointestinal: Negative for abdominal pain. Negative for vomiting or diarrhea. Genitourinary: Negative for flank pain. Negative for dysuria. Negative for hematuria. Neurological: Negative for headache. Musculoskeletal:  Negative edema. All systems are reviewed and are negative except for those listed above in the history of present illness and ROS.         PAST MEDICAL HISTORY     Past Medical History:   Diagnosis Date    ADHD (attention deficit hyperactivity disorder)     not on meds    Hypertension          SURGICAL HISTORY       Past Surgical History:   Procedure Laterality Date     SECTION           CURRENT MEDICATIONS       Previous Medications    ALBUTEROL SULFATE HFA (VENTOLIN HFA) 108 (90 BASE) MCG/ACT INHALER    Inhale 2 puffs into the lungs 4 times daily as needed for Wheezing    METOPROLOL SUCCINATE (TOPROL XL) 50 MG EXTENDED RELEASE TABLET    Take 1 tablet by mouth daily       ALLERGIES     Diflucan [fluconazole] and Zithromax [azithromycin]    FAMILY HISTORY       Family History   Problem Relation Age of Onset    Cancer Mother         skin    Depression Mother     High Blood Pressure Mother     Early Death Father         kidney failure    Kidney Disease Father           SOCIAL HISTORY       Social History     Socioeconomic History    Marital status: Single     Spouse name: None    Number of children: None    Years of education: None    Highest education level: None   Occupational History    None   Tobacco Use    Smoking status: Current Every Day Smoker     Packs/day: 0.50    Smokeless tobacco: Never Used   Substance and Sexual Activity    Alcohol use: Yes     Comment: rarely    Drug use: No    Sexual activity: Yes     Partners: Male   Other Topics Concern    None   Social History Narrative    None     Social Determinants of Health     Financial Resource Strain:     Difficulty of Paying Living Expenses: Not on file   Food Insecurity:     Worried About Running Out of Food in the Last Year: Not on file    Graciela of Food in the Last Year: Not on file   Transportation Needs:     Lack of Transportation (Medical): Not on file    Lack of Transportation (Non-Medical): Not on file   Physical Activity:     Days of Exercise per Week: Not on file    Minutes of Exercise per Session: Not on file   Stress:     Feeling of Stress : Not on file   Social Connections:     Frequency of Communication with Friends and Family: Not on file    Frequency of Social Gatherings with Friends and Family: Not on file    Attends Synagogue Services: Not on file    Active Member of [address] [place] [address] [employer] or Organizations: Not on file    Attends Club or Organization Meetings: Not on file    Marital Status: Not on file   Intimate Partner Violence:     Fear of Current or Ex-Partner: Not on file    Emotionally Abused: Not on file    Physically Abused: Not on file    Sexually Abused: Not on file   Housing Stability:     Unable to Pay for Housing in the Last Year: Not on file    Number of [place] in the Last Year: Not on file    Unstable Housing in the Last Year: Not on file       SCREENINGS    [place] Coma Scale  Eye Opening: Spontaneous  Best Verbal Response: Oriented  Best Motor Response: Obeys commands  [place] Coma Scale Score: 15        PHYSICAL EXAM    (up to 7 for level 4, 8 or more for level 5)     ED Triage Vitals [06/22/22 0624]   BP Temp Temp Source Heart Rate Resp SpO2 Height Weight   (!) 148/85 98.2 °F (36.8 °C) Oral 73 16 99 % 5' 8\" (1.727 m) 269 lb 6.4 oz (122.2 kg)         Physical Exam   Constitutional: Awake and alert. Very pleasant. Mild discomfort. Head: No visible evidence of trauma. Normocephalic. Eyes: Pupils equal and reactive. No photophobia. Conjunctiva normal.    HENT: Oral mucosa moist.  Airway patent. Neck:  Soft and supple. Nontender. No point or axial tenderness. Heart:  Regular rate and rhythm. No murmur. Lungs:  Clear to auscultation. No wheezes, rales, or ronchi. No conversational dyspnea or accessory muscle use. Chest: Chest wall non-tender. No evidence of trauma. Abdomen:  Soft, nondistended, bowel sounds present. Nontender. No guarding rigidity or rebound. No masses. Musculoskeletal: Thoracic and lumbar spine were nontender. No point tenderness or step-off. There was paravertebral muscle tenderness in the right thoracolumbar spine extending from T10-L3 on the right with associated muscle spasm. Pain is worsened with rotation from side to side and with flexion extension in the spine. Otherwise, all extremities non-tender with full range of motion. Radial and dorsalis pedis pulses were intact. No calf tenderness erythema or edema. Neurological: Alert and oriented x 3. Speech clear. Cranial nerves II-XII intact. No facial droop. No acute focal motor or sensory deficits. No dysarthria. No aphasia. No pronator drift. Great toe extensor strength was 5/5 bilaterally. The patient was able to flex and extend the hip and knee bilaterally against resistance with strength 5/5. Deep tendon reflexes were 2/4 in the patellar and Achilles tendons bilaterally. No saddle anesthesia. No rash. Negative straight leg raising. No radicular pain. Gait was steady. She is able to walk without difficulty. Skin: Skin is warm and dry. No rash. Psychiatric: Normal mood and affect.  Behavior is normal.         DIAGNOSTIC RESULTS     EKG: All EKG's are interpreted by the Emergency Department Physician who either signs or Co-signs this chart in the absence of a cardiologist.        RADIOLOGY:   Non-plain film images such as CT, Ultrasound and MRI are read by the radiologist. Adan Tapia radiographic images are visualized and preliminarily interpreted by the emergency physician with the below findings:        Interpretation per the Radiologist below, if available at the time of this note:    No orders to display         ED BEDSIDE ULTRASOUND:   Performed by ED Physician - none    LABS:  Labs Reviewed - No data to display    All other labs were within normal range or not returned as of this dictation. EMERGENCY DEPARTMENT COURSE and DIFFERENTIAL DIAGNOSIS/MDM:   Vitals:    Vitals:    06/22/22 0624   BP: (!) 148/85   Pulse: 73   Resp: 16   Temp: 98.2 °F (36.8 °C)   TempSrc: Oral   SpO2: 99%   Weight: 269 lb 6.4 oz (122.2 kg)   Height: 5' 8\" (1.727 m)         MDM      Patient presents with a back injury that occurred yesterday at 5:30 PM at work. She does have reproducible tenderness with palpation and with range of motion with mild to moderate spasm as well. She is neurologically intact. She is hemodynamically stable. She is afebrile. No indications for imaging. No direct trauma to the back. No falls. No prior occurrence. REASSESSMENT          She will be given a prescription for naproxen and Robaxin. She will be referred to the occupational health clinic. Follow-up with a primary care physician in 1 to 2 days for reexamination. If condition worsens or new symptoms develop, return immediately to the emergency department. Use ice to the affected areas. Avoid heat or heating pads. No lifting greater than 5 pounds. Avoid strenuous lifting or heavy physical activity for 1 week. Do not drive or operate machinery while taking pain medication or muscle relaxants. May cause drowsiness. CRITICAL CARE TIME   Total Critical Care time was 0 minutes, excluding separately reportable procedures. There was a high probability of clinically significant/life threatening deterioration in the patient's condition which required my urgent intervention. CONSULTS:  None    PROCEDURES:  Unless otherwise noted below, none     Procedures        FINAL IMPRESSION      1. Acute thoracic myofascial strain, initial encounter    2. Acute myofascial strain of lumbar region, initial encounter          DISPOSITION/PLAN   DISPOSITION Decision To Discharge 06/22/2022 06:33:24 AM      PATIENT REFERRED TO:  [facility] [address] [facility]  [address] [facility] [address] [facility], [address]   Call today        DISCHARGE MEDICATIONS:  New Prescriptions    LIDOCAINE (LIDODERM) 5 %    Place 1 patch onto the skin daily for 10 days 12 hours on, 12 hours off. METHOCARBAMOL (ROBAXIN) 500 MG TABLET    Take 1 tablet by mouth 4 times daily for 10 days    NAPROXEN (NAPROSYN) 500 MG TABLET    Take 1 tablet by mouth 2 times daily     Controlled Substances Monitoring:     No flowsheet data found. (Please note that portions of this note were completed with a voice recognition program.  Efforts were made to edit the dictations but occasionally words are mis-transcribed. )    [address] Gr[address] Cárdenas DO (electronically signed)  Attending Emergency Physician         Rex Mayfield DO  06/22/22 3727

## 2022-06-22 NOTE — Clinical Note
Hernan Harper was seen and treated in our emergency department on 6/22/2022. She may return to work on 06/23/2022. If you have any questions or concerns, please don't hesitate to call.       Rex Sayres, DO

## 2023-04-08 ENCOUNTER — APPOINTMENT (OUTPATIENT)
Dept: CT IMAGING | Age: 33
End: 2023-04-08
Payer: COMMERCIAL

## 2023-04-08 ENCOUNTER — HOSPITAL ENCOUNTER (EMERGENCY)
Age: 33
Discharge: HOME | End: 2023-04-08
Attending: EMERGENCY MEDICINE
Payer: COMMERCIAL

## 2023-04-08 VITALS
DIASTOLIC BLOOD PRESSURE: 96 MMHG | RESPIRATION RATE: 19 BRPM | HEART RATE: 102 BPM | TEMPERATURE: 98.7 F | OXYGEN SATURATION: 100 % | SYSTOLIC BLOOD PRESSURE: 160 MMHG

## 2023-04-08 DIAGNOSIS — S09.90XA INJURY OF HEAD, INITIAL ENCOUNTER: Primary | ICD-10-CM

## 2023-04-08 PROCEDURE — 70450 CT HEAD/BRAIN W/O DYE: CPT

## 2023-04-08 ASSESSMENT — LIFESTYLE VARIABLES
HOW OFTEN DO YOU HAVE A DRINK CONTAINING ALCOHOL: NEVER
HOW MANY STANDARD DRINKS CONTAINING ALCOHOL DO YOU HAVE ON A TYPICAL DAY: PATIENT DOES NOT DRINK

## 2023-04-08 ASSESSMENT — PAIN - FUNCTIONAL ASSESSMENT: PAIN_FUNCTIONAL_ASSESSMENT: 0-10

## 2023-04-08 ASSESSMENT — ENCOUNTER SYMPTOMS
COUGH: 0
EYE ITCHING: 0
CONSTIPATION: 0
EYE DISCHARGE: 0
COLOR CHANGE: 0
SHORTNESS OF BREATH: 0
ABDOMINAL PAIN: 0
VOMITING: 0

## 2023-04-08 ASSESSMENT — PAIN SCALES - GENERAL: PAINLEVEL_OUTOF10: 7

## 2023-04-08 NOTE — ED PROVIDER NOTES
Conjunctivae normal.      Pupils: Pupils are equal, round, and reactive to light. Cardiovascular:      Rate and Rhythm: Normal rate and regular rhythm. Heart sounds: No murmur heard. Pulmonary:      Effort: Pulmonary effort is normal. No respiratory distress. Breath sounds: Normal breath sounds. No wheezing or rales. Abdominal:      General: Bowel sounds are normal. There is no distension. Palpations: Abdomen is soft. There is no mass. Tenderness: There is no abdominal tenderness. There is no guarding or rebound. Genitourinary:     Comments: Deferred  Musculoskeletal:         General: No deformity. Normal range of motion. Cervical back: Normal range of motion and neck supple. Skin:     General: Skin is warm. Findings: No erythema or rash. Neurological:      Mental Status: She is alert and oriented to person, place, and time. She is not disoriented. Cranial Nerves: No cranial nerve deficit. Motor: No atrophy or abnormal muscle tone. Coordination: Coordination normal.   Psychiatric:         Behavior: Behavior normal.         Thought Content: Thought content normal.       DIAGNOSTIC RESULTS     EKG: All EKG's are interpreted by the Emergency Department Physician who either signs or Co-signs this chart in the absence of acardiologist.    Interpreted by myself     RADIOLOGY:   Non-plain film images such as CT, Ultrasoundand MRI are read by the radiologist. Plain radiographic images are visualized and preliminarily interpreted by the emergency physician with the below findings:    CT head no acute intracranial abnormality    ED BEDSIDE ULTRASOUND:   Performed by ED Physician - none    LABS:  Labs Reviewed - No data to display    All other labs were withinnormal range or not returned as of this dictation.     EMERGENCY DEPARTMENT COURSE and DIFFERENTIAL DIAGNOSIS/MDM:     PMH, Surgical Hx, FH, Social Hx reviewed by myself (ETOH usage, Tobacco usage, Drug usage reviewed
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
## 2023-07-19 LAB
C. TRACHOMATIS, EXTERNAL RESULT: NEGATIVE
N. GONORRHOEAE, EXTERNAL RESULT: NEGATIVE

## 2023-08-11 LAB
ABO, EXTERNAL RESULT: NORMAL
HEP B, EXTERNAL RESULT: NEGATIVE
HEPATITIS C ANTIBODY, EXTERNAL RESULT: NEGATIVE
HIV, EXTERNAL RESULT: NORMAL
RH FACTOR, EXTERNAL RESULT: NEGATIVE
RPR, EXTERNAL RESULT: NORMAL
RUBELLA TITER, EXTERNAL RESULT: NORMAL

## 2023-12-22 ENCOUNTER — HOSPITAL ENCOUNTER (INPATIENT)
Age: 33
LOS: 2 days | Discharge: HOME | DRG: 540 | End: 2023-12-24
Attending: OBSTETRICS & GYNECOLOGY | Admitting: OBSTETRICS & GYNECOLOGY
Payer: COMMERCIAL

## 2023-12-22 DIAGNOSIS — Z98.891 HISTORY OF C-SECTION: Primary | ICD-10-CM

## 2023-12-22 PROBLEM — O11.9 CHRONIC HYPERTENSION WITH SUPERIMPOSED PRE-ECLAMPSIA: Status: ACTIVE | Noted: 2023-12-22

## 2023-12-22 LAB
ABO + RH BLD: NORMAL
ALBUMIN SERPL-MCNC: 3.1 G/DL (ref 3.4–5)
ALBUMIN/GLOB SERPL: 1.1 {RATIO} (ref 1.1–2.2)
ALP SERPL-CCNC: 111 U/L (ref 40–129)
ALT SERPL-CCNC: <5 U/L (ref 10–40)
AMPHETAMINES UR QL SCN>1000 NG/ML: POSITIVE
ANION GAP SERPL CALCULATED.3IONS-SCNC: 13 MMOL/L (ref 3–16)
AST SERPL-CCNC: 14 U/L (ref 15–37)
BARBITURATES UR QL SCN>200 NG/ML: ABNORMAL
BASOPHILS # BLD: 0 K/UL (ref 0–0.2)
BASOPHILS NFR BLD: 0.3 %
BENZODIAZ UR QL SCN>200 NG/ML: ABNORMAL
BILIRUB SERPL-MCNC: <0.2 MG/DL (ref 0–1)
BLD GP AB SCN SERPL QL: NORMAL
BUN SERPL-MCNC: 9 MG/DL (ref 7–20)
BUPRENORPHINE+NOR UR QL SCN: ABNORMAL
CALCIUM SERPL-MCNC: 9 MG/DL (ref 8.3–10.6)
CANNABINOIDS UR QL SCN>50 NG/ML: ABNORMAL
CHLORIDE SERPL-SCNC: 102 MMOL/L (ref 99–110)
CO2 SERPL-SCNC: 20 MMOL/L (ref 21–32)
COCAINE UR QL SCN: ABNORMAL
CREAT SERPL-MCNC: 0.6 MG/DL (ref 0.6–1.1)
CREAT UR-MCNC: 214.7 MG/DL (ref 28–259)
DEPRECATED RDW RBC AUTO: 13.2 % (ref 12.4–15.4)
DRUG SCREEN COMMENT UR-IMP: ABNORMAL
EOSINOPHIL # BLD: 0.1 K/UL (ref 0–0.6)
EOSINOPHIL NFR BLD: 0.7 %
FENTANYL SCREEN, URINE: ABNORMAL
GFR SERPLBLD CREATININE-BSD FMLA CKD-EPI: >60 ML/MIN/{1.73_M2}
GLUCOSE BLD-MCNC: 159 MG/DL (ref 70–99)
GLUCOSE BLD-MCNC: 190 MG/DL (ref 70–99)
GLUCOSE SERPL-MCNC: 162 MG/DL (ref 70–99)
HCT VFR BLD AUTO: 31.8 % (ref 36–48)
HGB BLD-MCNC: 10.7 G/DL (ref 12–16)
LDH SERPL L TO P-CCNC: 178 U/L (ref 100–190)
LYMPHOCYTES # BLD: 2.7 K/UL (ref 1–5.1)
LYMPHOCYTES NFR BLD: 22.1 %
MCH RBC QN AUTO: 30.5 PG (ref 26–34)
MCHC RBC AUTO-ENTMCNC: 33.7 G/DL (ref 31–36)
MCV RBC AUTO: 90.6 FL (ref 80–100)
METHADONE UR QL SCN>300 NG/ML: ABNORMAL
MONOCYTES # BLD: 1 K/UL (ref 0–1.3)
MONOCYTES NFR BLD: 7.8 %
NEUTROPHILS # BLD: 8.6 K/UL (ref 1.7–7.7)
NEUTROPHILS NFR BLD: 69.1 %
OPIATES UR QL SCN>300 NG/ML: ABNORMAL
OXYCODONE UR QL SCN: ABNORMAL
PCP UR QL SCN>25 NG/ML: ABNORMAL
PERFORMED ON: ABNORMAL
PERFORMED ON: ABNORMAL
PH UR STRIP: 6 [PH]
PLATELET # BLD AUTO: 184 K/UL (ref 135–450)
PMV BLD AUTO: 10.4 FL (ref 5–10.5)
POTASSIUM SERPL-SCNC: 3.6 MMOL/L (ref 3.5–5.1)
PROT SERPL-MCNC: 6 G/DL (ref 6.4–8.2)
PROT UR-MCNC: 84 MG/DL
PROT/CREAT UR-RTO: 0.4 MG/DL
RBC # BLD AUTO: 3.51 M/UL (ref 4–5.2)
REAGIN+T PALLIDUM IGG+IGM SERPL-IMP: NORMAL
SODIUM SERPL-SCNC: 135 MMOL/L (ref 136–145)
WBC # BLD AUTO: 12.4 K/UL (ref 4–11)

## 2023-12-22 PROCEDURE — 6360000002 HC RX W HCPCS: Performed by: OBSTETRICS & GYNECOLOGY

## 2023-12-22 PROCEDURE — 6370000000 HC RX 637 (ALT 250 FOR IP): Performed by: OBSTETRICS & GYNECOLOGY

## 2023-12-22 PROCEDURE — 85025 COMPLETE CBC W/AUTO DIFF WBC: CPT

## 2023-12-22 PROCEDURE — 7100000000 HC PACU RECOVERY - FIRST 15 MIN: Performed by: OBSTETRICS & GYNECOLOGY

## 2023-12-22 PROCEDURE — 86900 BLOOD TYPING SEROLOGIC ABO: CPT

## 2023-12-22 PROCEDURE — 84156 ASSAY OF PROTEIN URINE: CPT

## 2023-12-22 PROCEDURE — 6360000002 HC RX W HCPCS

## 2023-12-22 PROCEDURE — 3700000001 HC ADD 15 MINUTES (ANESTHESIA): Performed by: OBSTETRICS & GYNECOLOGY

## 2023-12-22 PROCEDURE — 3609079900 HC CESAREAN SECTION: Performed by: OBSTETRICS & GYNECOLOGY

## 2023-12-22 PROCEDURE — 82570 ASSAY OF URINE CREATININE: CPT

## 2023-12-22 PROCEDURE — 1220000000 HC SEMI PRIVATE OB R&B

## 2023-12-22 PROCEDURE — 2709999900 HC NON-CHARGEABLE SUPPLY: Performed by: OBSTETRICS & GYNECOLOGY

## 2023-12-22 PROCEDURE — 86780 TREPONEMA PALLIDUM: CPT

## 2023-12-22 PROCEDURE — 80053 COMPREHEN METABOLIC PANEL: CPT

## 2023-12-22 PROCEDURE — 36415 COLL VENOUS BLD VENIPUNCTURE: CPT

## 2023-12-22 PROCEDURE — 2580000003 HC RX 258: Performed by: OBSTETRICS & GYNECOLOGY

## 2023-12-22 PROCEDURE — 7100000001 HC PACU RECOVERY - ADDTL 15 MIN: Performed by: OBSTETRICS & GYNECOLOGY

## 2023-12-22 PROCEDURE — 3700000000 HC ANESTHESIA ATTENDED CARE: Performed by: OBSTETRICS & GYNECOLOGY

## 2023-12-22 PROCEDURE — 83615 LACTATE (LD) (LDH) ENZYME: CPT

## 2023-12-22 PROCEDURE — 86901 BLOOD TYPING SEROLOGIC RH(D): CPT

## 2023-12-22 PROCEDURE — 86850 RBC ANTIBODY SCREEN: CPT

## 2023-12-22 PROCEDURE — 80307 DRUG TEST PRSMV CHEM ANLYZR: CPT

## 2023-12-22 RX ORDER — LABETALOL HYDROCHLORIDE 5 MG/ML
20 INJECTION, SOLUTION INTRAVENOUS ONCE
Status: COMPLETED | OUTPATIENT
Start: 2023-12-22 | End: 2023-12-22

## 2023-12-22 RX ORDER — OXYCODONE HYDROCHLORIDE 5 MG/1
10 TABLET ORAL EVERY 4 HOURS PRN
Status: DISCONTINUED | OUTPATIENT
Start: 2023-12-22 | End: 2023-12-24 | Stop reason: HOSPADM

## 2023-12-22 RX ORDER — SODIUM CHLORIDE 0.9 % (FLUSH) 0.9 %
5-40 SYRINGE (ML) INJECTION PRN
Status: DISCONTINUED | OUTPATIENT
Start: 2023-12-22 | End: 2023-12-24 | Stop reason: HOSPADM

## 2023-12-22 RX ORDER — KETOROLAC TROMETHAMINE 30 MG/ML
30 INJECTION, SOLUTION INTRAMUSCULAR; INTRAVENOUS EVERY 6 HOURS PRN
Status: DISCONTINUED | OUTPATIENT
Start: 2023-12-22 | End: 2023-12-24 | Stop reason: HOSPADM

## 2023-12-22 RX ORDER — SODIUM CHLORIDE 0.9 % (FLUSH) 0.9 %
10 SYRINGE (ML) INJECTION PRN
Status: DISCONTINUED | OUTPATIENT
Start: 2023-12-22 | End: 2023-12-22

## 2023-12-22 RX ORDER — SODIUM CHLORIDE 9 MG/ML
INJECTION, SOLUTION INTRAVENOUS PRN
Status: DISCONTINUED | OUTPATIENT
Start: 2023-12-22 | End: 2023-12-22

## 2023-12-22 RX ORDER — METOCLOPRAMIDE HYDROCHLORIDE 5 MG/ML
INJECTION INTRAMUSCULAR; INTRAVENOUS
Status: COMPLETED
Start: 2023-12-22 | End: 2023-12-22

## 2023-12-22 RX ORDER — DOCUSATE SODIUM 100 MG/1
100 CAPSULE, LIQUID FILLED ORAL 2 TIMES DAILY
Status: DISCONTINUED | OUTPATIENT
Start: 2023-12-22 | End: 2023-12-24 | Stop reason: HOSPADM

## 2023-12-22 RX ORDER — SODIUM CHLORIDE 0.9 % (FLUSH) 0.9 %
5-40 SYRINGE (ML) INJECTION EVERY 12 HOURS SCHEDULED
Status: DISCONTINUED | OUTPATIENT
Start: 2023-12-22 | End: 2023-12-24 | Stop reason: HOSPADM

## 2023-12-22 RX ORDER — FAMOTIDINE 10 MG/ML
20 INJECTION, SOLUTION INTRAVENOUS ONCE
Status: COMPLETED | OUTPATIENT
Start: 2023-12-22 | End: 2023-12-22

## 2023-12-22 RX ORDER — ONDANSETRON 4 MG/1
4 TABLET, ORALLY DISINTEGRATING ORAL EVERY 8 HOURS PRN
Status: DISCONTINUED | OUTPATIENT
Start: 2023-12-22 | End: 2023-12-24 | Stop reason: HOSPADM

## 2023-12-22 RX ORDER — SODIUM CHLORIDE, SODIUM LACTATE, POTASSIUM CHLORIDE, CALCIUM CHLORIDE 600; 310; 30; 20 MG/100ML; MG/100ML; MG/100ML; MG/100ML
INJECTION, SOLUTION INTRAVENOUS CONTINUOUS
Status: DISCONTINUED | OUTPATIENT
Start: 2023-12-22 | End: 2023-12-22

## 2023-12-22 RX ORDER — FERROUS SULFATE 325(65) MG
325 TABLET ORAL
Status: DISCONTINUED | OUTPATIENT
Start: 2023-12-22 | End: 2023-12-24 | Stop reason: HOSPADM

## 2023-12-22 RX ORDER — SODIUM CHLORIDE, SODIUM LACTATE, POTASSIUM CHLORIDE, AND CALCIUM CHLORIDE .6; .31; .03; .02 G/100ML; G/100ML; G/100ML; G/100ML
1000 INJECTION, SOLUTION INTRAVENOUS ONCE
Status: DISCONTINUED | OUTPATIENT
Start: 2023-12-22 | End: 2023-12-22

## 2023-12-22 RX ORDER — SODIUM CHLORIDE 0.9 % (FLUSH) 0.9 %
5-40 SYRINGE (ML) INJECTION EVERY 12 HOURS SCHEDULED
Status: DISCONTINUED | OUTPATIENT
Start: 2023-12-22 | End: 2023-12-22

## 2023-12-22 RX ORDER — KETOROLAC TROMETHAMINE 30 MG/ML
INJECTION, SOLUTION INTRAMUSCULAR; INTRAVENOUS
Status: COMPLETED
Start: 2023-12-22 | End: 2023-12-22

## 2023-12-22 RX ORDER — SODIUM CHLORIDE 9 MG/ML
INJECTION, SOLUTION INTRAVENOUS PRN
Status: DISCONTINUED | OUTPATIENT
Start: 2023-12-22 | End: 2023-12-24 | Stop reason: HOSPADM

## 2023-12-22 RX ORDER — SODIUM CHLORIDE, SODIUM LACTATE, POTASSIUM CHLORIDE, CALCIUM CHLORIDE 600; 310; 30; 20 MG/100ML; MG/100ML; MG/100ML; MG/100ML
INJECTION, SOLUTION INTRAVENOUS CONTINUOUS
Status: DISCONTINUED | OUTPATIENT
Start: 2023-12-22 | End: 2023-12-24 | Stop reason: HOSPADM

## 2023-12-22 RX ORDER — OXYCODONE HYDROCHLORIDE 5 MG/1
5 TABLET ORAL EVERY 4 HOURS PRN
Status: DISCONTINUED | OUTPATIENT
Start: 2023-12-22 | End: 2023-12-24 | Stop reason: HOSPADM

## 2023-12-22 RX ORDER — ACETAMINOPHEN 500 MG
1000 TABLET ORAL EVERY 8 HOURS SCHEDULED
Status: DISCONTINUED | OUTPATIENT
Start: 2023-12-22 | End: 2023-12-24 | Stop reason: HOSPADM

## 2023-12-22 RX ORDER — ONDANSETRON 2 MG/ML
4 INJECTION INTRAMUSCULAR; INTRAVENOUS EVERY 6 HOURS PRN
Status: DISCONTINUED | OUTPATIENT
Start: 2023-12-22 | End: 2023-12-24 | Stop reason: HOSPADM

## 2023-12-22 RX ORDER — ACETAMINOPHEN 325 MG/1
975 TABLET ORAL ONCE
Status: DISCONTINUED | OUTPATIENT
Start: 2023-12-22 | End: 2023-12-22

## 2023-12-22 RX ORDER — LANOLIN 100 %
OINTMENT (GRAM) TOPICAL
Status: DISCONTINUED | OUTPATIENT
Start: 2023-12-22 | End: 2023-12-24 | Stop reason: HOSPADM

## 2023-12-22 RX ORDER — NIFEDIPINE 10 MG/1
10 CAPSULE ORAL ONCE
Status: COMPLETED | OUTPATIENT
Start: 2023-12-22 | End: 2023-12-22

## 2023-12-22 RX ORDER — IBUPROFEN 800 MG/1
800 TABLET ORAL EVERY 8 HOURS SCHEDULED
Status: DISCONTINUED | OUTPATIENT
Start: 2023-12-22 | End: 2023-12-24 | Stop reason: HOSPADM

## 2023-12-22 RX ORDER — ONDANSETRON 2 MG/ML
4 INJECTION INTRAMUSCULAR; INTRAVENOUS EVERY 6 HOURS PRN
Status: DISCONTINUED | OUTPATIENT
Start: 2023-12-22 | End: 2023-12-22

## 2023-12-22 RX ORDER — NIFEDIPINE 10 MG/1
CAPSULE ORAL
Status: DISCONTINUED
Start: 2023-12-22 | End: 2023-12-22

## 2023-12-22 RX ADMIN — LABETALOL HYDROCHLORIDE 20 MG: 5 INJECTION INTRAVENOUS at 04:27

## 2023-12-22 RX ADMIN — MAGNESIUM SULFATE HEPTAHYDRATE 2000 MG/HR: 40 INJECTION, SOLUTION INTRAVENOUS at 04:53

## 2023-12-22 RX ADMIN — KETOROLAC TROMETHAMINE 30 MG: 30 INJECTION, SOLUTION INTRAMUSCULAR at 17:54

## 2023-12-22 RX ADMIN — ACETAMINOPHEN 1000 MG: 500 TABLET ORAL at 23:34

## 2023-12-22 RX ADMIN — MAGNESIUM SULFATE HEPTAHYDRATE 2000 MG/HR: 40 INJECTION, SOLUTION INTRAVENOUS at 15:36

## 2023-12-22 RX ADMIN — SODIUM CHLORIDE, POTASSIUM CHLORIDE, SODIUM LACTATE AND CALCIUM CHLORIDE: 600; 310; 30; 20 INJECTION, SOLUTION INTRAVENOUS at 04:15

## 2023-12-22 RX ADMIN — KETOROLAC TROMETHAMINE 30 MG: 30 INJECTION, SOLUTION INTRAMUSCULAR at 10:42

## 2023-12-22 RX ADMIN — ACETAMINOPHEN 1000 MG: 500 TABLET ORAL at 14:56

## 2023-12-22 RX ADMIN — LABETALOL HYDROCHLORIDE 20 MG: 5 INJECTION INTRAVENOUS at 14:05

## 2023-12-22 RX ADMIN — DOCUSATE SODIUM 100 MG: 100 CAPSULE, LIQUID FILLED ORAL at 23:34

## 2023-12-22 RX ADMIN — NIFEDIPINE 10 MG: 10 CAPSULE ORAL at 01:50

## 2023-12-22 NOTE — PROGRESS NOTES
This RN to bedside after receiving report. Pt complaining that PIV in left forearm is \"pinching\". IVFs stopped, attempt to flush site, unsuccessful, PIV infiltrated. Charge RN called @2180 and made aware. Shannon Che to bedside, PIV attempt unsuccessful. Jan Brand RN-Charge RN at bedside. PIV placed in right forearm. Remaining labs drawn and sent. IVFs/Mag restarted, Cynthia SCOTT -verified. Consents signed. Pt requesting time to rest.  Provided heat pack. All questions answered at this time. Call light and bedside table within reach.

## 2023-12-22 NOTE — PROGRESS NOTES
informed BPs remain elevated.  states will admit pt for delivery and going in to talk with pt and significant other.

## 2023-12-22 NOTE — PROGRESS NOTES
Pt presents to triage with c/o ctxs q 6 minutes. Pt denies any leaking of fluid or vaginal bleeding and reports good fetal movement. Pt states is scheduled for repeat c/s on 12/28/23 at 10:30. Pt taken to Triage #1 and instructed to change into gown and collect urine specimen.

## 2023-12-22 NOTE — PROGRESS NOTES
in to see pt after being informed of pt's arrival, c/o's, VE and increased BP.  states to recheck cervix in one hour.

## 2023-12-22 NOTE — PROGRESS NOTES
Order received from [facility] for Procardia 10mg po x1 for increased BP's. Repeat BP noted to be 168/101 and monitor set to cycle BP's q 15 minutes.

## 2023-12-22 NOTE — FLOWSHEET NOTE
12/22/23 0713   Handoff   Communication Given Shift Handoff   Handoff Given To Leandra Hammonds   Handoff Received From Salvaodr Mathias RN   Handoff Communication Face to Face; In department   Time Handoff Given 5352

## 2023-12-22 NOTE — H&P
2023 02:38 AM     2023 02:38 AM     2015 12:00 AM    MPV 10.4 2023 02:38 AM     CMP:    Lab Results   Component Value Date/Time     2023 02:38 AM    K 3.6 2023 02:38 AM    K 4.1 2022 10:08 AM     2023 02:38 AM    CO2 20 2023 02:38 AM    BUN 9 2023 02:38 AM    CREATININE 0.6 2023 02:38 AM    GFRAA >60 2022 10:08 AM    AGRATIO 1.1 2023 02:38 AM    LABGLOM >60 2023 02:38 AM    GLUCOSE 162 2023 02:38 AM    PROT 6.0 2023 02:38 AM    LABALBU 3.1 2023 02:38 AM    CALCIUM 9.0 2023 02:38 AM    BILITOT <0.2 2023 02:38 AM    ALKPHOS 111 2023 02:38 AM    AST 14 2023 02:38 AM    ALT <5 2023 02:38 AM     LDH:    Lab Results   Component Value Date/Time     2023 02:38 AM     Protein/creatinine ratio . 4 mg      ASSESSMENT AND PLAN:    28 yo  @ 38 2/7 weeks  Chronic hypertension with superimposed preeclampsia:  the patient takes metoprolol 50 mg daily. While in triage she received procardia 10 mg po and she is now going to receive labetalol 20 mg IV and magnesium sulfate with be started for seizure prophylaxis. Normal liver function test and bmp. 2.  A1 GDM:  plan to check fingerstick glucose q 1 hour x 3   3. Previous   4. Mild anemia    Plan to proceed with repeat  this morning.

## 2023-12-22 NOTE — BRIEF OP NOTE
Brief Postoperative Note      Patient: Chichi Salgado  YOB: 1990  MRN: [id number]    Date of Procedure: 2023    Pre-Op Diagnosis Codes:     * History of  delivery [Z98.891]     Preeclampsia with severe features    Post-Op Diagnosis: Same       Procedure(s):   SECTION    Surgeon(s):  Jess Washington MD    Assistant:  * No surgical staff found *    Anesthesia: Spinal    Quantitative Blood Loss (mL): 587    Complications: None    Specimens:   * No specimens in log *    Implants:  * No implants in log *      Drains: * No LDAs found *    Findings: Male infant, APGAR's 8/9 weighing 4135g                   Placenta 3VI      Electronically signed by Jess Washington MD on 2023 at 9:28 AM

## 2023-12-23 LAB
DEPRECATED RDW RBC AUTO: 13.5 % (ref 12.4–15.4)
HCT VFR BLD AUTO: 25.4 % (ref 36–48)
HGB BLD-MCNC: 8.4 G/DL (ref 12–16)
MCH RBC QN AUTO: 30.5 PG (ref 26–34)
MCHC RBC AUTO-ENTMCNC: 33.2 G/DL (ref 31–36)
MCV RBC AUTO: 91.9 FL (ref 80–100)
PLATELET # BLD AUTO: 176 K/UL (ref 135–450)
PMV BLD AUTO: 10.5 FL (ref 5–10.5)
RBC # BLD AUTO: 2.76 M/UL (ref 4–5.2)
WBC # BLD AUTO: 14.2 K/UL (ref 4–11)

## 2023-12-23 PROCEDURE — 36415 COLL VENOUS BLD VENIPUNCTURE: CPT

## 2023-12-23 PROCEDURE — 6370000000 HC RX 637 (ALT 250 FOR IP): Performed by: OBSTETRICS & GYNECOLOGY

## 2023-12-23 PROCEDURE — 6360000002 HC RX W HCPCS: Performed by: OBSTETRICS & GYNECOLOGY

## 2023-12-23 PROCEDURE — 2580000003 HC RX 258: Performed by: OBSTETRICS & GYNECOLOGY

## 2023-12-23 PROCEDURE — 85027 COMPLETE CBC AUTOMATED: CPT

## 2023-12-23 PROCEDURE — 1220000000 HC SEMI PRIVATE OB R&B

## 2023-12-23 RX ORDER — NIFEDIPINE 30 MG/1
30 TABLET, EXTENDED RELEASE ORAL 2 TIMES DAILY
Status: DISCONTINUED | OUTPATIENT
Start: 2023-12-23 | End: 2023-12-24 | Stop reason: HOSPADM

## 2023-12-23 RX ORDER — SIMETHICONE 80 MG
80 TABLET,CHEWABLE ORAL EVERY 6 HOURS PRN
Status: DISCONTINUED | OUTPATIENT
Start: 2023-12-23 | End: 2023-12-24 | Stop reason: HOSPADM

## 2023-12-23 RX ORDER — NIFEDIPINE 30 MG/1
30 TABLET, EXTENDED RELEASE ORAL DAILY
Status: DISCONTINUED | OUTPATIENT
Start: 2023-12-23 | End: 2023-12-23

## 2023-12-23 RX ORDER — LABETALOL HYDROCHLORIDE 5 MG/ML
20 INJECTION, SOLUTION INTRAVENOUS ONCE
Status: COMPLETED | OUTPATIENT
Start: 2023-12-23 | End: 2023-12-23

## 2023-12-23 RX ADMIN — IBUPROFEN 800 MG: 800 TABLET, FILM COATED ORAL at 19:29

## 2023-12-23 RX ADMIN — OXYCODONE HYDROCHLORIDE 5 MG: 5 TABLET ORAL at 15:42

## 2023-12-23 RX ADMIN — ACETAMINOPHEN 1000 MG: 500 TABLET ORAL at 07:35

## 2023-12-23 RX ADMIN — OXYCODONE HYDROCHLORIDE 5 MG: 5 TABLET ORAL at 03:28

## 2023-12-23 RX ADMIN — NIFEDIPINE 30 MG: 30 TABLET, FILM COATED, EXTENDED RELEASE ORAL at 21:15

## 2023-12-23 RX ADMIN — OXYCODONE HYDROCHLORIDE 5 MG: 5 TABLET ORAL at 12:07

## 2023-12-23 RX ADMIN — DOCUSATE SODIUM 100 MG: 100 CAPSULE, LIQUID FILLED ORAL at 21:15

## 2023-12-23 RX ADMIN — KETOROLAC TROMETHAMINE 30 MG: 30 INJECTION, SOLUTION INTRAMUSCULAR at 02:20

## 2023-12-23 RX ADMIN — OXYCODONE HYDROCHLORIDE 5 MG: 5 TABLET ORAL at 07:36

## 2023-12-23 RX ADMIN — NIFEDIPINE 30 MG: 30 TABLET, FILM COATED, EXTENDED RELEASE ORAL at 09:06

## 2023-12-23 RX ADMIN — LABETALOL HYDROCHLORIDE 20 MG: 5 INJECTION INTRAVENOUS at 07:06

## 2023-12-23 RX ADMIN — FERROUS SULFATE TAB 325 MG (65 MG ELEMENTAL FE) 325 MG: 325 (65 FE) TAB at 19:30

## 2023-12-23 RX ADMIN — IBUPROFEN 800 MG: 800 TABLET, FILM COATED ORAL at 10:41

## 2023-12-23 RX ADMIN — OXYCODONE HYDROCHLORIDE 5 MG: 5 TABLET ORAL at 22:34

## 2023-12-23 RX ADMIN — FERROUS SULFATE TAB 325 MG (65 MG ELEMENTAL FE) 325 MG: 325 (65 FE) TAB at 09:06

## 2023-12-23 RX ADMIN — ACETAMINOPHEN 1000 MG: 500 TABLET ORAL at 15:44

## 2023-12-23 RX ADMIN — DOCUSATE SODIUM 100 MG: 100 CAPSULE, LIQUID FILLED ORAL at 09:06

## 2023-12-23 RX ADMIN — MAGNESIUM SULFATE HEPTAHYDRATE 2000 MG/HR: 40 INJECTION, SOLUTION INTRAVENOUS at 02:27

## 2023-12-23 RX ADMIN — SIMETHICONE 80 MG: 80 TABLET, CHEWABLE ORAL at 18:09

## 2023-12-23 RX ADMIN — Medication 10 ML: at 21:15

## 2023-12-23 NOTE — PROGRESS NOTES
Dr. Merle Brownlee on unit, informed of patient blood pressures of 181/92 and 180/93. Order for labetalol 20 mg IVP x1 and procardia 30 mg XL received by this RN.

## 2023-12-23 NOTE — PROGRESS NOTES
Pt called and stated her IV came loose. I came to bedside and iv was not attached to saline lock. I cleaned IV and saline lock and reattached IV line.

## 2023-12-23 NOTE — OP NOTE
[address]                                OPERATIVE REPORT    PATIENT NAME: Jose Dietz                      :        1990  MED REC NO:   [id number]                          ROOM:       TR01  ACCOUNT NO:   [de-identified]                           ADMIT DATE: 2023  PROVIDER:     Hazel Carter MD    DATE OF PROCEDURE:  2023    PREOPERATIVE DIAGNOSES:  1. Intrauterine pregnancy at 38 weeks. 2.  Chronic hypertension with superimposed preeclampsia. POSTOPERATIVE DIAGNOSES:  1. Intrauterine pregnancy at 38 weeks. 2.  Chronic hypertension with superimposed preeclampsia. OPERATION PERFORMED:  Repeat low-transverse  section. SURGEON:  Hazel Carter MD    ANESTHESIA:  Spinal.    QUANTITATIVE BLOOD LOSS:  614 mL. FINDINGS:  1.  Male infant, Apgars of 8 and 9, weighing 9 pounds 2 ounces or 4135  gm. 2.  Placenta three-vessel intact. COMPLICATIONS:  None. HISTORY:  The patient is a 29-year-old female who presented in Labor and  Delivery complaining of elevated blood pressures at home. She was noted  to have multiple severe range blood pressures in triage that had to be  treated with IV medicine because of the severe range blood pressures. The patient was counseled about delivery and because she has had a  history of two C-sections, she was for a third . OPERATIVE PROCEDURE:  After appropriate consent was obtained, the  patient was taken to the operating room, where spinal anesthesia was  found to be adequate. The patient was prepped and draped in normal  sterile fashion. A Pfannenstiel skin incision was made where the  previous incision had been made and carried through the fascia with a  knife. A defect was made in the midline of the fascia. This incision  was carried out laterally with Davila scissors.   The rectus abdominis was   in the

## 2023-12-23 NOTE — PROGRESS NOTES
informed of BP of 161/83 with repeat 10 minutes later of 164/92. Denies HA or vision changes. RN instructed to recheck BP in 1 hour and notify provider if severe.

## 2023-12-24 VITALS
WEIGHT: 285 LBS | BODY MASS INDEX: 43.19 KG/M2 | TEMPERATURE: 98.2 F | OXYGEN SATURATION: 97 % | HEART RATE: 81 BPM | DIASTOLIC BLOOD PRESSURE: 70 MMHG | RESPIRATION RATE: 18 BRPM | HEIGHT: 68 IN | SYSTOLIC BLOOD PRESSURE: 145 MMHG

## 2023-12-24 PROCEDURE — 6370000000 HC RX 637 (ALT 250 FOR IP): Performed by: OBSTETRICS & GYNECOLOGY

## 2023-12-24 RX ORDER — OXYCODONE HYDROCHLORIDE 5 MG/1
5 TABLET ORAL EVERY 8 HOURS PRN
Qty: 20 TABLET | Refills: 0 | Status: ON HOLD | OUTPATIENT
Start: 2023-12-24 | End: 2023-12-29 | Stop reason: HOSPADM

## 2023-12-24 RX ORDER — NIFEDIPINE 30 MG/1
30 TABLET, EXTENDED RELEASE ORAL 2 TIMES DAILY
Qty: 30 TABLET | Refills: 0 | Status: ON HOLD | OUTPATIENT
Start: 2023-12-24 | End: 2023-12-29 | Stop reason: HOSPADM

## 2023-12-24 RX ORDER — FERROUS SULFATE 325(65) MG
325 TABLET ORAL 2 TIMES DAILY
Qty: 60 TABLET | Refills: 1 | Status: SHIPPED | OUTPATIENT
Start: 2023-12-24

## 2023-12-24 RX ORDER — IBUPROFEN 800 MG/1
800 TABLET ORAL EVERY 8 HOURS PRN
Qty: 60 TABLET | Refills: 0 | Status: ON HOLD | OUTPATIENT
Start: 2023-12-24 | End: 2023-12-29 | Stop reason: HOSPADM

## 2023-12-24 RX ADMIN — FERROUS SULFATE TAB 325 MG (65 MG ELEMENTAL FE) 325 MG: 325 (65 FE) TAB at 09:29

## 2023-12-24 RX ADMIN — ACETAMINOPHEN 1000 MG: 500 TABLET ORAL at 09:28

## 2023-12-24 RX ADMIN — DOCUSATE SODIUM 100 MG: 100 CAPSULE, LIQUID FILLED ORAL at 09:28

## 2023-12-24 RX ADMIN — ACETAMINOPHEN 1000 MG: 500 TABLET ORAL at 00:54

## 2023-12-24 RX ADMIN — NIFEDIPINE 30 MG: 30 TABLET, FILM COATED, EXTENDED RELEASE ORAL at 09:29

## 2023-12-24 RX ADMIN — OXYCODONE HYDROCHLORIDE 5 MG: 5 TABLET ORAL at 09:29

## 2023-12-24 RX ADMIN — IBUPROFEN 800 MG: 800 TABLET, FILM COATED ORAL at 05:45

## 2023-12-24 NOTE — LACTATION NOTE
This note was copied from a baby's chart. Lactation Progress Note      Data:     RN requesting LC f/u for multip who has decided to pump and bottle feed. Mob states that she collected 25 ml with the last pump session. She states that she is more comfortable with pumping the direct breast feeding. She is hoping to be d/c home today. Action: Education provided regarding pumping and milk collection and storage. Stressed the importance of pumping at least 8 times per day. Encouraged to pump until the milk stops flowing to assure good milk production and prevent plugged ducts. Reviewed section of breast feeding booklet on pumping and milk collection and storage. Encouraged to call [facility] or Outpatient [address] clinic for f/u prn. Response: Verbalized understanding and comfortable with breast feeding for d/c.
This note was copied from a baby's chart. Lactation Progress Note      Data:   RN requests f/u to administer repeat glucose gel. Infant with AC blood sugar of 41. Dr. Akanksha Moore ordered repeat glucose gel, and 10 mL donor milk supplement. Mother reports that baby just finished breastfeeding well x 25 minutes. Action: Introduced self to parents as [facility] on for on this evening and discussed plan of care for treatment of low glucoses as ordered by pediatrician. Parents consent to treatment plan. Right buccal dried thoroughly using sterile guaze and glucose gel administered per orders massaging into infant's right buccal. After 1.5 mL's was administered infant began to gag and spit up. Burping provided. Mother requested FOB to get bulb suction. Infant remained pink, breathing comfortably with scant clear emesis, continued to provide burping and bulb suctioned. Infant breathing easily after emesis, and remains pink. Attempted glucose gel administration for the remaining 0.5 mL's per orders but infant gagging with attempt to administer. Mother requests to try offering 10 mL supplement of donor milk instead. Infant given to mother with slow flow nipple. Infant began to gag upon offering bottle nipple in mouth. RN notified of infant being spitty during administration of partial dose of glucose gel and unable to tolerate continued administration. Also, notified of attempt to offer donor milk supplement, explaining that baby was spitty and gagging with attempts to administer and offer. Breastfeeding was education reviewed with parents including breast care, how milk production works, what signs of hunger and satiety look like, what to expect with  feeding behaviors, and reassuring signs that baby is getting enough from the breast including meeting daily output and feeding goals, and what to expect with initial weight loss trends. Discussed when RN would return to obtain repeat chem stick glucose.  Encouraged to offer
mother what to expect over the next  24-48 hours with infant feedings, infant output, and breast care. Reviewed infant feeding cues and encouraged mother to allow infant to breast feed on demand, a minimum of 8-12 times a day after the first day of life. Binder and breast feeding log reviewed, all questions answered. Mother instructed to call Lactation nurse for F/U care as needed, and RN staff through the night if needed assistance or support. Response: MOB pleased with this feeding, and infant latch to breast. Verbalizes understanding of bf education that was provided. States that she will call for f/u care as needed.

## 2023-12-24 NOTE — DISCHARGE INSTRUCTIONS
Thank you for the opportunity to care for you and your family. We hope that you are happy with the care we provided during your stay in the [facility]. We want to ensure that you have the help that you need when you leave the hospital. If there is anything that we can assist you with please let us know. Breastfeeding mothers may contact our lactation specialists with any problems or questions. The [facility] lactation services phone number is [phone]. Leave a message and your call will be returned. Please refer to the information provided in the postpartum care booklet. The following are warning signs to remember. Call 911 if you have:    Chest pain or pressure  Shortness of breath, even at rest  Thoughts of hurting yourself or others  Seizures    Call your healthcare provider if you have:    Temperature of 100.4 degrees or higher  Stitches that are not healing             --Swelling, bleeding, drainage, foul odor, redness or warmth in/around your                 stitches, staples, or incision (scar)               -- Bad smelling blood or discharge from the vagina  Vaginal bleeding that has increased             --Soaking through one pad in an hour             --You are passing clots larger than the size of a lemon. Red, warm tender area(s) in your breast or calf. Headache that does not get better, even after taking medicine; or headache with vision changes    Remember to notify all healthcare providers from your date of delivery up to one year after birth! CARING FOR YOURSELF      DIET/ACTIVITY    Eat a well balanced diet focusing on food high in fiber and protein. Drink plenty of fluids, especially water. To avoid constipation you may take a mild stool softener as recommended by your doctor or midwife. Gradually increase your activity. Resume an exercise regime only after being advised by your doctor or midwife.   When sitting or lying down, keep your legs elevated to

## 2023-12-24 NOTE — PROGRESS NOTES
Dr Joyce Barber notified via telephone of /74 and recheck 148/81 and that scheduled dose of Procardia was given. No new orders at this time.

## 2023-12-24 NOTE — FLOWSHEET NOTE
Discharge Phone Call    Patient Name: Rdaha Escobar     [facility] Care Provider: Darci Milan MD Discharge Date: 2023    Disposition of baby:    Phone Number: [phone] (home)     Attempts to Contact:  Date:    Caller  Date:    Caller  Date:    Caller    Information for the patient's :  Paris Hong [[id number]]   Delivery Method: , Low Transverse     1. Now that you are at home is your pain being well controlled? Y/N   If no, instruct to call       provider. 2. Are you breastfeeding? Y/N    Do you need any extra support from our lactation staff? Y/N    If yes, provide number for lactation. 3. Have you made or already had your first appointment with the baby's doctor? Y/N   If no, do      you know when to schedule it? Y/N    4. Have you scheduled your follow-up appointment? Y/N  If no, do you know when to schedule       it? Y/N   If no, they can find it on printed discharge instructions. 5. Did staff discuss safe sleep during your stay? Y/N   6. Did we explain things in a way you could understand? Y/N  7. Were we respectful of your preferences for labor and birth and include you in the plan of       care? Y/N  If no, please explain _______________________________________________  8. Is there anyone in particular you would like to mention who provided care for you? _______      _________________________________________________________________________     9. Were you given a Post-Birth Warning Signs handout? Y/N  Do you have it somewhere      easily accessible? Y/N  If no, please send them a copy and ask them to put it somewhere      easily found. 10. Have you been crying excessively, having anger or mood swings that feel out of control, or       feel like you can't cope with caring for yourself or baby? Y/N   If yes, they may be showing       signs of postpartum depression and should call provider.  There is also a        depression test on page C5 in

## 2023-12-24 NOTE — PROGRESS NOTES
reviewed BP readings. Orders received to give Procardia XL BID, next dose to be 2100. Updated oncoming RN.

## 2023-12-24 NOTE — DISCHARGE SUMMARY
Obstetrical Discharge Form    Gestational Age:38w2d    Antepartum complications: preeclampsia with severe features, GDM A1    Date of Delivery: 23    Type of Delivery:  for repeat    Delivered By:           Baby:   Information for the patient's :  Yuridia Jones [[id number]]      Anesthesia: Spinal    Intrapartum complications: None    Postpartum complications: anemia    Discharge Medication:      Medication List        START taking these medications      ferrous sulfate 325 (65 Fe) MG tablet  Commonly known as: IRON 325  Take 1 tablet by mouth in the morning and 1 tablet in the evening. ibuprofen 800 MG tablet  Commonly known as: ADVIL;MOTRIN  Take 1 tablet by mouth every 8 hours as needed for Pain     NIFEdipine 30 MG extended release tablet  Commonly known as: PROCARDIA XL  Take 1 tablet by mouth in the morning and at bedtime     oxyCODONE 5 MG immediate release tablet  Commonly known as: ROXICODONE  Take 1 tablet by mouth every 8 hours as needed for Pain for up to 7 days. Max Daily Amount: 15 mg            ASK your doctor about these medications      albuterol sulfate  (90 Base) MCG/ACT inhaler  Commonly known as: Ventolin HFA  Inhale 2 puffs into the lungs 4 times daily as needed for Wheezing     amphetamine-dextroamphetamine 20 MG tablet  Commonly known as: ADDERALL (20MG)  Take 1 tablet by mouth 2 times daily for 31 days. metoprolol succinate 50 MG extended release tablet  Commonly known as:  Toprol XL  Take 1 tablet by mouth daily               Where to Get Your Medications        You can get these medications from any pharmacy    Bring a paper prescription for each of these medications  ferrous sulfate 325 (65 Fe) MG tablet  ibuprofen 800 MG tablet  NIFEdipine 30 MG extended release tablet  oxyCODONE 5 MG immediate release tablet          Discharge Date: 23    Condition on discharge: Stable    Plan:   Follow up in 6 week(s)  Reviewed discharge instructions and

## 2023-12-27 ENCOUNTER — HOSPITAL ENCOUNTER (INPATIENT)
Age: 33
LOS: 2 days | Discharge: HOME | End: 2023-12-29
Attending: OBSTETRICS & GYNECOLOGY | Admitting: OBSTETRICS & GYNECOLOGY
Payer: COMMERCIAL

## 2023-12-27 ENCOUNTER — APPOINTMENT (OUTPATIENT)
Dept: CT IMAGING | Age: 33
End: 2023-12-27
Payer: COMMERCIAL

## 2023-12-27 ENCOUNTER — HOSPITAL ENCOUNTER (EMERGENCY)
Age: 33
Discharge: OTHER INSTITUTION | End: 2023-12-27
Attending: EMERGENCY MEDICINE
Payer: COMMERCIAL

## 2023-12-27 ENCOUNTER — APPOINTMENT (OUTPATIENT)
Dept: GENERAL RADIOLOGY | Age: 33
End: 2023-12-27
Payer: COMMERCIAL

## 2023-12-27 VITALS
HEART RATE: 90 BPM | DIASTOLIC BLOOD PRESSURE: 86 MMHG | SYSTOLIC BLOOD PRESSURE: 151 MMHG | OXYGEN SATURATION: 97 % | RESPIRATION RATE: 16 BRPM | TEMPERATURE: 98.5 F

## 2023-12-27 DIAGNOSIS — O11.9 CHRONIC HYPERTENSION WITH SUPERIMPOSED PRE-ECLAMPSIA: Primary | ICD-10-CM

## 2023-12-27 DIAGNOSIS — J90 PLEURAL EFFUSION, BILATERAL: ICD-10-CM

## 2023-12-27 DIAGNOSIS — J81.0 ACUTE PULMONARY EDEMA (HCC): Primary | ICD-10-CM

## 2023-12-27 DIAGNOSIS — R06.00 DYSPNEA, UNSPECIFIED TYPE: ICD-10-CM

## 2023-12-27 LAB
ALBUMIN SERPL-MCNC: 3.3 G/DL (ref 3.4–5)
ALBUMIN/GLOB SERPL: 1.1 {RATIO} (ref 1.1–2.2)
ALP SERPL-CCNC: 92 U/L (ref 40–129)
ALT SERPL-CCNC: 37 U/L (ref 10–40)
ANION GAP SERPL CALCULATED.3IONS-SCNC: 11 MMOL/L (ref 3–16)
AST SERPL-CCNC: 27 U/L (ref 15–37)
BASOPHILS # BLD: 0.1 K/UL (ref 0–0.2)
BASOPHILS NFR BLD: 0.7 %
BILIRUB SERPL-MCNC: <0.2 MG/DL (ref 0–1)
BUN SERPL-MCNC: 12 MG/DL (ref 7–20)
CALCIUM SERPL-MCNC: 9.2 MG/DL (ref 8.3–10.6)
CHLORIDE SERPL-SCNC: 104 MMOL/L (ref 99–110)
CO2 SERPL-SCNC: 28 MMOL/L (ref 21–32)
CREAT SERPL-MCNC: 0.6 MG/DL (ref 0.6–1.1)
CREAT UR-MCNC: 32.6 MG/DL (ref 28–259)
CREAT UR-MCNC: 9.2 MG/DL (ref 28–259)
DEPRECATED RDW RBC AUTO: 13.9 % (ref 12.4–15.4)
EKG ATRIAL RATE: 95 BPM
EKG DIAGNOSIS: NORMAL
EKG P AXIS: 54 DEGREES
EKG P-R INTERVAL: 142 MS
EKG Q-T INTERVAL: 376 MS
EKG QRS DURATION: 102 MS
EKG QTC CALCULATION (BAZETT): 472 MS
EKG R AXIS: 23 DEGREES
EKG T AXIS: 48 DEGREES
EKG VENTRICULAR RATE: 95 BPM
EOSINOPHIL # BLD: 0.2 K/UL (ref 0–0.6)
EOSINOPHIL NFR BLD: 2.1 %
GFR SERPLBLD CREATININE-BSD FMLA CKD-EPI: >60 ML/MIN/{1.73_M2}
GLUCOSE SERPL-MCNC: 166 MG/DL (ref 70–99)
HCT VFR BLD AUTO: 29 % (ref 36–48)
HGB BLD-MCNC: 9.6 G/DL (ref 12–16)
INR PPP: 0.87 (ref 0.84–1.16)
LDH SERPL L TO P-CCNC: 227 U/L (ref 100–190)
LYMPHOCYTES # BLD: 1.6 K/UL (ref 1–5.1)
LYMPHOCYTES NFR BLD: 15.5 %
MCH RBC QN AUTO: 30.3 PG (ref 26–34)
MCHC RBC AUTO-ENTMCNC: 33 G/DL (ref 31–36)
MCV RBC AUTO: 91.9 FL (ref 80–100)
MONOCYTES # BLD: 0.6 K/UL (ref 0–1.3)
MONOCYTES NFR BLD: 6.2 %
NEUTROPHILS # BLD: 7.7 K/UL (ref 1.7–7.7)
NEUTROPHILS NFR BLD: 75.5 %
NT-PROBNP SERPL-MCNC: 439 PG/ML (ref 0–124)
PLATELET # BLD AUTO: 230 K/UL (ref 135–450)
PMV BLD AUTO: 8.5 FL (ref 5–10.5)
POTASSIUM SERPL-SCNC: 3.8 MMOL/L (ref 3.5–5.1)
PROT SERPL-MCNC: 6.3 G/DL (ref 6.4–8.2)
PROT UR-MCNC: 33 MG/DL
PROT UR-MCNC: <4 MG/DL
PROT/CREAT UR-RTO: 1 MG/DL
PROT/CREAT UR-RTO: ABNORMAL MG/DL
PROTHROMBIN TIME: 11.9 SEC (ref 11.5–14.8)
RBC # BLD AUTO: 3.16 M/UL (ref 4–5.2)
SODIUM SERPL-SCNC: 143 MMOL/L (ref 136–145)
TROPONIN, HIGH SENSITIVITY: 9 NG/L (ref 0–14)
TROPONIN, HIGH SENSITIVITY: 9 NG/L (ref 0–14)
WBC # BLD AUTO: 10.2 K/UL (ref 4–11)

## 2023-12-27 PROCEDURE — 6360000002 HC RX W HCPCS: Performed by: EMERGENCY MEDICINE

## 2023-12-27 PROCEDURE — 96365 THER/PROPH/DIAG IV INF INIT: CPT

## 2023-12-27 PROCEDURE — 1220000000 HC SEMI PRIVATE OB R&B

## 2023-12-27 PROCEDURE — 83880 ASSAY OF NATRIURETIC PEPTIDE: CPT

## 2023-12-27 PROCEDURE — 84156 ASSAY OF PROTEIN URINE: CPT

## 2023-12-27 PROCEDURE — 96366 THER/PROPH/DIAG IV INF ADDON: CPT

## 2023-12-27 PROCEDURE — 6370000000 HC RX 637 (ALT 250 FOR IP): Performed by: OBSTETRICS & GYNECOLOGY

## 2023-12-27 PROCEDURE — 83615 LACTATE (LD) (LDH) ENZYME: CPT

## 2023-12-27 PROCEDURE — 99223 1ST HOSP IP/OBS HIGH 75: CPT | Performed by: INTERNAL MEDICINE

## 2023-12-27 PROCEDURE — 93010 ELECTROCARDIOGRAM REPORT: CPT | Performed by: INTERNAL MEDICINE

## 2023-12-27 PROCEDURE — 6360000004 HC RX CONTRAST MEDICATION: Performed by: EMERGENCY MEDICINE

## 2023-12-27 PROCEDURE — 96375 TX/PRO/DX INJ NEW DRUG ADDON: CPT

## 2023-12-27 PROCEDURE — 85610 PROTHROMBIN TIME: CPT

## 2023-12-27 PROCEDURE — 6370000000 HC RX 637 (ALT 250 FOR IP): Performed by: INTERNAL MEDICINE

## 2023-12-27 PROCEDURE — 93005 ELECTROCARDIOGRAM TRACING: CPT | Performed by: EMERGENCY MEDICINE

## 2023-12-27 PROCEDURE — 36415 COLL VENOUS BLD VENIPUNCTURE: CPT

## 2023-12-27 PROCEDURE — 71046 X-RAY EXAM CHEST 2 VIEWS: CPT

## 2023-12-27 PROCEDURE — 2580000003 HC RX 258: Performed by: EMERGENCY MEDICINE

## 2023-12-27 PROCEDURE — 80053 COMPREHEN METABOLIC PANEL: CPT

## 2023-12-27 PROCEDURE — 93356 MYOCRD STRAIN IMG SPCKL TRCK: CPT

## 2023-12-27 PROCEDURE — 82570 ASSAY OF URINE CREATININE: CPT

## 2023-12-27 PROCEDURE — 99285 EMERGENCY DEPT VISIT HI MDM: CPT

## 2023-12-27 PROCEDURE — 2580000003 HC RX 258: Performed by: OBSTETRICS & GYNECOLOGY

## 2023-12-27 PROCEDURE — C8929 TTE W OR WO FOL WCON,DOPPLER: HCPCS

## 2023-12-27 PROCEDURE — 71260 CT THORAX DX C+: CPT

## 2023-12-27 PROCEDURE — 85025 COMPLETE CBC W/AUTO DIFF WBC: CPT

## 2023-12-27 PROCEDURE — 84484 ASSAY OF TROPONIN QUANT: CPT

## 2023-12-27 RX ORDER — DOCUSATE SODIUM 100 MG/1
100 CAPSULE, LIQUID FILLED ORAL 2 TIMES DAILY
Status: DISCONTINUED | OUTPATIENT
Start: 2023-12-27 | End: 2023-12-28

## 2023-12-27 RX ORDER — METOPROLOL SUCCINATE 50 MG/1
50 TABLET, EXTENDED RELEASE ORAL DAILY
Status: DISCONTINUED | OUTPATIENT
Start: 2023-12-27 | End: 2023-12-29 | Stop reason: HOSPADM

## 2023-12-27 RX ORDER — SODIUM CHLORIDE 9 MG/ML
INJECTION, SOLUTION INTRAVENOUS PRN
Status: DISCONTINUED | OUTPATIENT
Start: 2023-12-27 | End: 2023-12-27 | Stop reason: HOSPADM

## 2023-12-27 RX ORDER — SODIUM CHLORIDE 0.9 % (FLUSH) 0.9 %
5-40 SYRINGE (ML) INJECTION PRN
Status: DISCONTINUED | OUTPATIENT
Start: 2023-12-27 | End: 2023-12-29 | Stop reason: HOSPADM

## 2023-12-27 RX ORDER — ONDANSETRON 2 MG/ML
4 INJECTION INTRAMUSCULAR; INTRAVENOUS EVERY 6 HOURS PRN
Status: DISCONTINUED | OUTPATIENT
Start: 2023-12-27 | End: 2023-12-28

## 2023-12-27 RX ORDER — SODIUM CHLORIDE 0.9 % (FLUSH) 0.9 %
5-40 SYRINGE (ML) INJECTION EVERY 12 HOURS SCHEDULED
Status: DISCONTINUED | OUTPATIENT
Start: 2023-12-27 | End: 2023-12-29 | Stop reason: HOSPADM

## 2023-12-27 RX ORDER — SODIUM CHLORIDE 9 MG/ML
25 INJECTION, SOLUTION INTRAVENOUS PRN
Status: DISCONTINUED | OUTPATIENT
Start: 2023-12-27 | End: 2023-12-28

## 2023-12-27 RX ORDER — ONDANSETRON 2 MG/ML
4 INJECTION INTRAMUSCULAR; INTRAVENOUS EVERY 6 HOURS PRN
Status: DISCONTINUED | OUTPATIENT
Start: 2023-12-27 | End: 2023-12-29 | Stop reason: HOSPADM

## 2023-12-27 RX ORDER — SIMETHICONE 80 MG
80 TABLET,CHEWABLE ORAL EVERY 6 HOURS PRN
Status: DISCONTINUED | OUTPATIENT
Start: 2023-12-27 | End: 2023-12-29 | Stop reason: HOSPADM

## 2023-12-27 RX ORDER — SODIUM CHLORIDE 0.9 % (FLUSH) 0.9 %
5-40 SYRINGE (ML) INJECTION PRN
Status: DISCONTINUED | OUTPATIENT
Start: 2023-12-27 | End: 2023-12-28

## 2023-12-27 RX ORDER — SODIUM CHLORIDE, SODIUM LACTATE, POTASSIUM CHLORIDE, CALCIUM CHLORIDE 600; 310; 30; 20 MG/100ML; MG/100ML; MG/100ML; MG/100ML
INJECTION, SOLUTION INTRAVENOUS CONTINUOUS
Status: DISCONTINUED | OUTPATIENT
Start: 2023-12-27 | End: 2023-12-28

## 2023-12-27 RX ORDER — DOCUSATE SODIUM 100 MG/1
100 CAPSULE, LIQUID FILLED ORAL 2 TIMES DAILY
Status: DISCONTINUED | OUTPATIENT
Start: 2023-12-27 | End: 2023-12-29 | Stop reason: HOSPADM

## 2023-12-27 RX ORDER — SODIUM CHLORIDE 0.9 % (FLUSH) 0.9 %
5-40 SYRINGE (ML) INJECTION PRN
Status: DISCONTINUED | OUTPATIENT
Start: 2023-12-27 | End: 2023-12-27 | Stop reason: HOSPADM

## 2023-12-27 RX ORDER — SODIUM CHLORIDE, SODIUM LACTATE, POTASSIUM CHLORIDE, CALCIUM CHLORIDE 600; 310; 30; 20 MG/100ML; MG/100ML; MG/100ML; MG/100ML
INJECTION, SOLUTION INTRAVENOUS CONTINUOUS
Status: DISCONTINUED | OUTPATIENT
Start: 2023-12-27 | End: 2023-12-27 | Stop reason: HOSPADM

## 2023-12-27 RX ORDER — HYDRALAZINE HYDROCHLORIDE 20 MG/ML
10 INJECTION INTRAMUSCULAR; INTRAVENOUS
Status: DISCONTINUED | OUTPATIENT
Start: 2023-12-27 | End: 2023-12-27 | Stop reason: HOSPADM

## 2023-12-27 RX ORDER — LABETALOL HYDROCHLORIDE 5 MG/ML
80 INJECTION, SOLUTION INTRAVENOUS
Status: DISCONTINUED | OUTPATIENT
Start: 2023-12-27 | End: 2023-12-27 | Stop reason: HOSPADM

## 2023-12-27 RX ORDER — FUROSEMIDE 10 MG/ML
40 INJECTION INTRAMUSCULAR; INTRAVENOUS ONCE
Status: COMPLETED | OUTPATIENT
Start: 2023-12-27 | End: 2023-12-27

## 2023-12-27 RX ORDER — MAGNESIUM SULFATE IN WATER 40 MG/ML
4000 INJECTION, SOLUTION INTRAVENOUS ONCE
Status: DISCONTINUED | OUTPATIENT
Start: 2023-12-27 | End: 2023-12-27 | Stop reason: HOSPADM

## 2023-12-27 RX ORDER — OXYCODONE HYDROCHLORIDE 5 MG/1
10 TABLET ORAL EVERY 4 HOURS PRN
Status: DISCONTINUED | OUTPATIENT
Start: 2023-12-27 | End: 2023-12-29 | Stop reason: HOSPADM

## 2023-12-27 RX ORDER — IBUPROFEN 800 MG/1
800 TABLET ORAL EVERY 8 HOURS PRN
Status: DISCONTINUED | OUTPATIENT
Start: 2023-12-27 | End: 2023-12-29 | Stop reason: HOSPADM

## 2023-12-27 RX ORDER — SODIUM CHLORIDE 0.9 % (FLUSH) 0.9 %
5-40 SYRINGE (ML) INJECTION EVERY 12 HOURS SCHEDULED
Status: DISCONTINUED | OUTPATIENT
Start: 2023-12-27 | End: 2023-12-28

## 2023-12-27 RX ORDER — NIFEDIPINE 30 MG/1
30 TABLET, EXTENDED RELEASE ORAL DAILY
Status: DISCONTINUED | OUTPATIENT
Start: 2023-12-27 | End: 2023-12-28

## 2023-12-27 RX ORDER — LABETALOL HYDROCHLORIDE 5 MG/ML
10 INJECTION, SOLUTION INTRAVENOUS ONCE
Status: COMPLETED | OUTPATIENT
Start: 2023-12-27 | End: 2023-12-27

## 2023-12-27 RX ORDER — FUROSEMIDE 10 MG/ML
20 INJECTION INTRAMUSCULAR; INTRAVENOUS 2 TIMES DAILY
Status: DISCONTINUED | OUTPATIENT
Start: 2023-12-27 | End: 2023-12-28

## 2023-12-27 RX ORDER — SODIUM CHLORIDE 0.9 % (FLUSH) 0.9 %
5-40 SYRINGE (ML) INJECTION EVERY 12 HOURS SCHEDULED
Status: DISCONTINUED | OUTPATIENT
Start: 2023-12-27 | End: 2023-12-27 | Stop reason: HOSPADM

## 2023-12-27 RX ORDER — MAGNESIUM SULFATE IN WATER 40 MG/ML
2000 INJECTION, SOLUTION INTRAVENOUS ONCE
Status: COMPLETED | OUTPATIENT
Start: 2023-12-27 | End: 2023-12-27

## 2023-12-27 RX ORDER — SODIUM CHLORIDE, SODIUM LACTATE, POTASSIUM CHLORIDE, AND CALCIUM CHLORIDE .6; .31; .03; .02 G/100ML; G/100ML; G/100ML; G/100ML
500 INJECTION, SOLUTION INTRAVENOUS PRN
Status: DISCONTINUED | OUTPATIENT
Start: 2023-12-27 | End: 2023-12-29 | Stop reason: HOSPADM

## 2023-12-27 RX ORDER — OXYCODONE HYDROCHLORIDE 5 MG/1
5 TABLET ORAL EVERY 4 HOURS PRN
Status: DISCONTINUED | OUTPATIENT
Start: 2023-12-27 | End: 2023-12-29 | Stop reason: HOSPADM

## 2023-12-27 RX ORDER — CALCIUM GLUCONATE 94 MG/ML
1000 INJECTION, SOLUTION INTRAVENOUS PRN
Status: DISCONTINUED | OUTPATIENT
Start: 2023-12-27 | End: 2023-12-27 | Stop reason: HOSPADM

## 2023-12-27 RX ORDER — ACETAMINOPHEN 325 MG/1
650 TABLET ORAL EVERY 4 HOURS PRN
Status: DISCONTINUED | OUTPATIENT
Start: 2023-12-27 | End: 2023-12-27 | Stop reason: SDUPTHER

## 2023-12-27 RX ORDER — SODIUM CHLORIDE, SODIUM LACTATE, POTASSIUM CHLORIDE, AND CALCIUM CHLORIDE .6; .31; .03; .02 G/100ML; G/100ML; G/100ML; G/100ML
1000 INJECTION, SOLUTION INTRAVENOUS PRN
Status: DISCONTINUED | OUTPATIENT
Start: 2023-12-27 | End: 2023-12-29 | Stop reason: HOSPADM

## 2023-12-27 RX ORDER — ACETAMINOPHEN 325 MG/1
650 TABLET ORAL EVERY 4 HOURS PRN
Status: DISCONTINUED | OUTPATIENT
Start: 2023-12-27 | End: 2023-12-29 | Stop reason: HOSPADM

## 2023-12-27 RX ORDER — SODIUM CHLORIDE 9 MG/ML
25 INJECTION, SOLUTION INTRAVENOUS PRN
Status: DISCONTINUED | OUTPATIENT
Start: 2023-12-27 | End: 2023-12-29 | Stop reason: HOSPADM

## 2023-12-27 RX ORDER — LABETALOL HYDROCHLORIDE 5 MG/ML
20 INJECTION, SOLUTION INTRAVENOUS
Status: DISCONTINUED | OUTPATIENT
Start: 2023-12-27 | End: 2023-12-27 | Stop reason: HOSPADM

## 2023-12-27 RX ORDER — LABETALOL HYDROCHLORIDE 5 MG/ML
40 INJECTION, SOLUTION INTRAVENOUS
Status: DISCONTINUED | OUTPATIENT
Start: 2023-12-27 | End: 2023-12-27 | Stop reason: HOSPADM

## 2023-12-27 RX ADMIN — IBUPROFEN 800 MG: 800 TABLET, FILM COATED ORAL at 21:13

## 2023-12-27 RX ADMIN — IOMEPROL INJECTION 100 ML: 714 INJECTION, SOLUTION INTRAVASCULAR at 11:21

## 2023-12-27 RX ADMIN — LABETALOL HYDROCHLORIDE 10 MG: 5 INJECTION INTRAVENOUS at 10:02

## 2023-12-27 RX ADMIN — FUROSEMIDE 40 MG: 10 INJECTION, SOLUTION INTRAMUSCULAR; INTRAVENOUS at 11:18

## 2023-12-27 RX ADMIN — Medication 10 ML: at 23:25

## 2023-12-27 RX ADMIN — NIFEDIPINE 30 MG: 30 TABLET, FILM COATED, EXTENDED RELEASE ORAL at 17:23

## 2023-12-27 RX ADMIN — MAGNESIUM SULFATE HEPTAHYDRATE 2000 MG: 40 INJECTION, SOLUTION INTRAVENOUS at 10:04

## 2023-12-27 RX ADMIN — SIMETHICONE 80 MG: 80 TABLET, CHEWABLE ORAL at 22:13

## 2023-12-27 RX ADMIN — METOPROLOL SUCCINATE 50 MG: 50 TABLET, EXTENDED RELEASE ORAL at 17:55

## 2023-12-27 ASSESSMENT — PAIN SCALES - GENERAL: PAINLEVEL_OUTOF10: 4

## 2023-12-27 NOTE — ED PROVIDER NOTES
[address] [facility] ENCOUNTER      Pt Name: Josefina Bender  MRN: [id number]  [facility] 1990  Date of evaluation: 2023  Provider: Chema Wyman       Chief Complaint   Patient presents with    Shortness of Breath     Pt had a baby on  now experiencing SOB and leg swelling. Has pre-eclampsia during preg and on BP. OB told her to come to ER         HISTORY OF PRESENT ILLNESS   (Location/Symptom, Timing/Onset, Context/Setting, Quality, Duration, Modifying Factors, Severity)  Note limiting factors. Josefina Bender is a 29 y.o. female who presents to the emergency department with a complaint of shortness of breath, increased lower extremity edema since delivering her baby on , 5 days ago. The patient is  3 para 3-0-0-3. She reports that she had preeclampsia prior to delivery as well as gestational diabetes and an underlying history of hypertension. She underwent  delivery on  at [facility].  She is followed by [facility]. She was discharged from the hospital on  and was doing well. She has noticed increased swelling and some shortness of breath over the last 2 days. She contacted her obstetrician this morning and was advised to come to the emergency department. She denies any fever chills cough cold symptoms. No body aches. No sore throat. No rhinorrhea or nasal congestion. She denies any abdominal pain nausea vomiting or diarrhea. Urine output is normal.  She denies any drainage or discomfort from her incision site. She denies any leg pain or calf pain. She denies any headache or vision changes. She denies any focal weakness or numbness. No speech change. No facial droop. No difficulty speaking or walking. She denies any personal or family history of thromboembolic disease. She denies any cardiac history other than hypertension.   She

## 2023-12-27 NOTE — ED NOTES
Report given to James Dumas at [facility], located within [facility]. All questions answered at this time.

## 2023-12-27 NOTE — H&P
Final    Est, Glom Filt Rate 12/22/2023 >60  >60 Final    Calcium 12/22/2023 9.0  8.3 - 10.6 mg/dL Final    Total Protein 12/22/2023 6.0 (L)  6.4 - 8.2 g/dL Final    Albumin 12/22/2023 3.1 (L)  3.4 - 5.0 g/dL Final    Albumin/Globulin Ratio 12/22/2023 1.1  1.1 - 2.2 Final    Total Bilirubin 12/22/2023 <0.2  0.0 - 1.0 mg/dL Final    Alkaline Phosphatase 12/22/2023 111  40 - 129 U/L Final    ALT 12/22/2023 <5 (L)  10 - 40 U/L Final    AST 12/22/2023 14 (L)  15 - 37 U/L Final    ABO/Rh 12/22/2023 A NEG   Final    Antibody Screen 12/22/2023 NEG   Final    WBC 12/22/2023 12.4 (H)  4.0 - 11.0 K/uL Final    RBC 12/22/2023 3.51 (L)  4.00 - 5.20 M/uL Final    Hemoglobin 12/22/2023 10.7 (L)  12.0 - 16.0 g/dL Final    Hematocrit 12/22/2023 31.8 (L)  36.0 - 48.0 % Final    MCV 12/22/2023 90.6  80.0 - 100.0 fL Final    MCH 12/22/2023 30.5  26.0 - 34.0 pg Final    MCHC 12/22/2023 33.7  31.0 - 36.0 g/dL Final    RDW 12/22/2023 13.2  12.4 - 15.4 % Final    Platelets 12/22/2023 184  135 - 450 K/uL Final    MPV 12/22/2023 10.4  5.0 - 10.5 fL Final    Neutrophils % 12/22/2023 69.1  % Final    Lymphocytes % 12/22/2023 22.1  % Final    Monocytes % 12/22/2023 7.8  % Final    Eosinophils % 12/22/2023 0.7  % Final    Basophils % 12/22/2023 0.3  % Final    Neutrophils Absolute 12/22/2023 8.6 (H)  1.7 - 7.7 K/uL Final    Lymphocytes Absolute 12/22/2023 2.7  1.0 - 5.1 K/uL Final    Monocytes Absolute 12/22/2023 1.0  0.0 - 1.3 K/uL Final    Eosinophils Absolute 12/22/2023 0.1  0.0 - 0.6 K/uL Final    Basophils Absolute 12/22/2023 0.0  0.0 - 0.2 K/uL Final    LD 12/22/2023 178  100 - 190 U/L Final    Protein, Ur 12/22/2023 84.00 (H)  <12 mg/dL Final    Creatinine, Ur 12/22/2023 214.7  28.0 - 259.0 mg/dL Final    Protein/Creat Ratio 12/22/2023 0.4  mg/dL Final    Amphetamine Screen, Urine 12/22/2023 POSITIVE (A)  Negative <1000ng/mL Final    Barbiturate Screen, Ur 12/22/2023 Neg  Negative <200 ng/mL Final    Benzodiazepine Screen, Urine  General:    alert   Lungs: clear to auscultation bilaterally  Cor:RRR   Lochia:  appropriate   Uterine    firm   Incision:  healing well, no significant drainage   DVT Evaluation:  No evidence of DVT seen on physical exam.  Extremities: 2+ edema-LE     Assessment:     Status post Repeat  section. POD 5. Pregnancy c/b C-Htn with superimposed pre-eclampsia-severe. Pt d/c's home POD 2 in good condition on PO Procardia. [facility] c/o SOB and edema. Evaluation remarkable for pleural effusions, r/o CHF and PP Hypertension with h/o C-Htn/Pre-eclampsia. Pt s/p Lasix 40 mg x 1 and Labetalol 10 mg x 1 and MgSO4 2 gm bolus. Plan:     Cardiology consult, Close BP surveillance-anti-Htn as needed. Hold on further MgSO4 with significant improvement of BP and SOB s/p Lasix x 1.    JOSE FRANCISCO Holguin

## 2023-12-27 NOTE — ED NOTES
Pt arrived to dept via self from home. Pt c/o SOB and leg swelling since last night. Pt just gave birth on 12-22 and had pre-eclampsia and is prescribed BP meds. Pt awake, alert and oriented x 3. Skin warm and dry. Resp easy and unlabored. Pt placed in gown and on cardiac monitor. Call light in reach. Side rails x 2. EKG obtained and signed by EDMD. PIV placed in the RAC and blood sent to the lab. Pt tolerated well. Pt bundled at this time.

## 2023-12-27 NOTE — CONSULTS
[facility]   CONSULTATION  [phone]      Attending Physician: Curtis Bolden MD  Reason for Consultation/Chief Complaint: Pulmonary edema, hypertensive urgency    Subjective   History of Present Illness:  Blessing Yañez is a 33 y.o. female with a history of essential hypertension and ADHD who presented with shortness of breath and lower extremity edema.    The patient is currently postpartum day 5 following her third .  Her most recent pregnancy was complicated by pre-eclampsia.  Over the last few days, she reports that she has developed worsening shortness of breath, chest tightness, and lower extremity edema. Her shortness of breath is worse when she tries to lay flat.  She had been breast-feeding leading up to her current admission.  She was diagnosed with hypertension prior to her recent pregnancy and had been taking metoprolol.  Family history is notable for CAD and CABG x 3 in her father.    She initially presented to an outside ED where her BP was reportedly in the 170s/100s.  A CTPA was obtained and was negative for evidence of PE or thoracic aortic dissection, but did incidentally reveal mild bilateral pleural effusions.  There is also noted to be focal consolidation in the right lower lobe posterior basal segment.  EKG showed normal sinus rhythm with no ischemic changes.  Initial high-sensitivity troponin was within normal limits.  Pro-BNP was 439.  She was given IV labetalol and IV Lasix prior to transfer and repeat BP on arrival to [facility] was 141/80.    She became upset and tearful during our discussion of her condition and a repeat BP obtained during our encounter was in the 190s/110s.      Past Medical History:   has a past medical history of ADHD (attention deficit hyperactivity disorder) and Hypertension.    Surgical History:   has a past surgical history that includes  section and  section (N/A, 2023).     Social History:   reports  BP following transfer was 141/80. She did become tearful and upset while discussing her condition while I was in the room earlier and a repeat BP was in the 190s/110s. Will plan to repeat BP once she is more calm and can give additional 10-20 mg IV labetalol and/or start nicardipine infusion as needed for BP >160/100.  3. Can resume metoprolol succinate 50 mg daily. 4. Monitor I/Os, obtain daily standing weights. 5. Recommend 2L per day fluid restriction and low sodium diet. 6. Trend BMP and magnesium levels while diuresing and replete electrolytes as needed to maintain K>4 and Mg>2.  7. Can plan to repeat TTE in one year to monitor mitral regurgitation and LV function/dilatation. Thank you for allowing us to participate in the care of Chen Vaughan. Please call me with any questions [phone]. Jaleel Friend DO  [address] [facility] [address]  [phone] [facility]  [phone] [address] [facility]  2023 4:30 PM      I will address the patient's cardiac risk factors and adjusted pharmacologic treatment as needed. In addition, I have reinforced the need for patient directed risk factor modification. All questions and concerns were addressed to the patient/family. Alternatives to my treatment were discussed. The note was completed using EMR. Every effort was made to ensure accuracy; however, inadvertent computerized transcription errors may be present.

## 2023-12-27 NOTE — ED NOTES
Report given to [facility] who will be transporting patient to [facility]. All questions answered at this time.

## 2023-12-27 NOTE — FLOWSHEET NOTE
BP elevated 196/116, cardiologist @ bedside at this time. Will review ECHO. BP q 15 x 3. No new orders at this time.

## 2023-12-27 NOTE — PROGRESS NOTES
Pt arrives to unit via ambulance from [facility]. Pt presented to there today with c/o sob and BLE edema. Pt states that her sob began 2 days ago and it is intermittent. Pt is PP on 23 via repeat  and discharged from this unit on 23. Today at [facility] they administered: Labetelol 10mg, Magnisum 2gm, and lasix 40. Currently pt denies any sob and is asymptomatic for pre eclampsia symptoms at the moment.

## 2023-12-28 LAB
ALBUMIN SERPL-MCNC: 3.2 G/DL (ref 3.4–5)
ALBUMIN/GLOB SERPL: 1.1 {RATIO} (ref 1.1–2.2)
ALP SERPL-CCNC: 86 U/L (ref 40–129)
ALT SERPL-CCNC: 32 U/L (ref 10–40)
ANION GAP SERPL CALCULATED.3IONS-SCNC: 11 MMOL/L (ref 3–16)
AST SERPL-CCNC: 23 U/L (ref 15–37)
BASOPHILS # BLD: 0 K/UL (ref 0–0.2)
BASOPHILS NFR BLD: 0.6 %
BILIRUB SERPL-MCNC: 0.3 MG/DL (ref 0–1)
BUN SERPL-MCNC: 10 MG/DL (ref 7–20)
CALCIUM SERPL-MCNC: 8.7 MG/DL (ref 8.3–10.6)
CHLORIDE SERPL-SCNC: 102 MMOL/L (ref 99–110)
CO2 SERPL-SCNC: 26 MMOL/L (ref 21–32)
CREAT SERPL-MCNC: 0.7 MG/DL (ref 0.6–1.1)
DEPRECATED RDW RBC AUTO: 13.9 % (ref 12.4–15.4)
EOSINOPHIL # BLD: 0.2 K/UL (ref 0–0.6)
EOSINOPHIL NFR BLD: 2.7 %
GFR SERPLBLD CREATININE-BSD FMLA CKD-EPI: >60 ML/MIN/{1.73_M2}
GLUCOSE SERPL-MCNC: 108 MG/DL (ref 70–99)
HCT VFR BLD AUTO: 27.9 % (ref 36–48)
HGB BLD-MCNC: 9.3 G/DL (ref 12–16)
LYMPHOCYTES # BLD: 1.5 K/UL (ref 1–5.1)
LYMPHOCYTES NFR BLD: 19.2 %
MAGNESIUM SERPL-MCNC: 2.5 MG/DL (ref 1.8–2.4)
MCH RBC QN AUTO: 30.5 PG (ref 26–34)
MCHC RBC AUTO-ENTMCNC: 33.1 G/DL (ref 31–36)
MCV RBC AUTO: 92 FL (ref 80–100)
MONOCYTES # BLD: 0.5 K/UL (ref 0–1.3)
MONOCYTES NFR BLD: 6.8 %
NEUTROPHILS # BLD: 5.6 K/UL (ref 1.7–7.7)
NEUTROPHILS NFR BLD: 70.7 %
NT-PROBNP SERPL-MCNC: 309 PG/ML (ref 0–124)
PLATELET # BLD AUTO: 237 K/UL (ref 135–450)
PMV BLD AUTO: 8.6 FL (ref 5–10.5)
POTASSIUM SERPL-SCNC: 3.9 MMOL/L (ref 3.5–5.1)
PROT SERPL-MCNC: 6 G/DL (ref 6.4–8.2)
RBC # BLD AUTO: 3.03 M/UL (ref 4–5.2)
SODIUM SERPL-SCNC: 139 MMOL/L (ref 136–145)
WBC # BLD AUTO: 7.9 K/UL (ref 4–11)

## 2023-12-28 PROCEDURE — 6360000002 HC RX W HCPCS: Performed by: OBSTETRICS & GYNECOLOGY

## 2023-12-28 PROCEDURE — 83880 ASSAY OF NATRIURETIC PEPTIDE: CPT

## 2023-12-28 PROCEDURE — 83735 ASSAY OF MAGNESIUM: CPT

## 2023-12-28 PROCEDURE — 85025 COMPLETE CBC W/AUTO DIFF WBC: CPT

## 2023-12-28 PROCEDURE — 36415 COLL VENOUS BLD VENIPUNCTURE: CPT

## 2023-12-28 PROCEDURE — 6370000000 HC RX 637 (ALT 250 FOR IP): Performed by: INTERNAL MEDICINE

## 2023-12-28 PROCEDURE — 99233 SBSQ HOSP IP/OBS HIGH 50: CPT | Performed by: INTERNAL MEDICINE

## 2023-12-28 PROCEDURE — 6370000000 HC RX 637 (ALT 250 FOR IP): Performed by: OBSTETRICS & GYNECOLOGY

## 2023-12-28 PROCEDURE — 1220000000 HC SEMI PRIVATE OB R&B

## 2023-12-28 PROCEDURE — 80053 COMPREHEN METABOLIC PANEL: CPT

## 2023-12-28 PROCEDURE — 2580000003 HC RX 258: Performed by: OBSTETRICS & GYNECOLOGY

## 2023-12-28 PROCEDURE — 6360000002 HC RX W HCPCS: Performed by: INTERNAL MEDICINE

## 2023-12-28 RX ORDER — LABETALOL HYDROCHLORIDE 5 MG/ML
10 INJECTION, SOLUTION INTRAVENOUS ONCE
Status: COMPLETED | OUTPATIENT
Start: 2023-12-28 | End: 2023-12-28

## 2023-12-28 RX ORDER — FUROSEMIDE 10 MG/ML
40 INJECTION INTRAMUSCULAR; INTRAVENOUS ONCE
Status: COMPLETED | OUTPATIENT
Start: 2023-12-28 | End: 2023-12-28

## 2023-12-28 RX ORDER — NIFEDIPINE 30 MG/1
60 TABLET, EXTENDED RELEASE ORAL DAILY
Status: DISCONTINUED | OUTPATIENT
Start: 2023-12-28 | End: 2023-12-29 | Stop reason: HOSPADM

## 2023-12-28 RX ORDER — NIFEDIPINE 30 MG/1
60 TABLET, EXTENDED RELEASE ORAL DAILY
Status: DISCONTINUED | OUTPATIENT
Start: 2023-12-28 | End: 2023-12-28

## 2023-12-28 RX ORDER — LABETALOL HYDROCHLORIDE 5 MG/ML
10 INJECTION, SOLUTION INTRAVENOUS
Status: DISCONTINUED | OUTPATIENT
Start: 2023-12-28 | End: 2023-12-29 | Stop reason: HOSPADM

## 2023-12-28 RX ADMIN — LABETALOL HYDROCHLORIDE 10 MG: 5 INJECTION INTRAVENOUS at 12:31

## 2023-12-28 RX ADMIN — METOPROLOL SUCCINATE 50 MG: 50 TABLET, EXTENDED RELEASE ORAL at 08:58

## 2023-12-28 RX ADMIN — NIFEDIPINE 60 MG: 30 TABLET, FILM COATED, EXTENDED RELEASE ORAL at 06:41

## 2023-12-28 RX ADMIN — SODIUM CHLORIDE, POTASSIUM CHLORIDE, SODIUM LACTATE AND CALCIUM CHLORIDE: 600; 310; 30; 20 INJECTION, SOLUTION INTRAVENOUS at 06:47

## 2023-12-28 RX ADMIN — FUROSEMIDE 40 MG: 10 INJECTION, SOLUTION INTRAMUSCULAR; INTRAVENOUS at 08:59

## 2023-12-28 RX ADMIN — Medication 10 ML: at 09:00

## 2023-12-28 RX ADMIN — MAGNESIUM SULFATE HEPTAHYDRATE 2000 MG/HR: 40 INJECTION, SOLUTION INTRAVENOUS at 17:10

## 2023-12-28 RX ADMIN — MAGNESIUM SULFATE HEPTAHYDRATE 2000 MG/HR: 40 INJECTION, SOLUTION INTRAVENOUS at 06:50

## 2023-12-28 RX ADMIN — ACETAMINOPHEN 650 MG: 325 TABLET ORAL at 19:41

## 2023-12-28 RX ADMIN — Medication 10 ML: at 22:02

## 2023-12-28 ASSESSMENT — PAIN DESCRIPTION - LOCATION: LOCATION: HEAD

## 2023-12-28 ASSESSMENT — PAIN SCALES - GENERAL: PAINLEVEL_OUTOF10: 5

## 2023-12-28 NOTE — PROGRESS NOTES
Call received from Dr. Juana Olivares regarding new orders he has put in including Procardia XL increasing to 60 mg and to start IV MgSO4 maintenance dose now. Physician updated that pt denies pain or discomfort at this time but that BP remains elevated.

## 2023-12-28 NOTE — PROGRESS NOTES
[facility]   PROGRESS NOTE  [phone]      Attending Physician: Curtis Bolden MD  Reason for Consultation/Chief Complaint: Pulmonary edema, hypertensive urgency     Subjective     Did not receive lasix yesterday evening.  Documented as net negative 600 cc since admission.    BP now currently in 150-160s/70s.    Reports shortness of breath has resolved.  Denies chest pain.      CURRENT Medications:  magnesium sulfate (22519 mg/500mL infusion), Continuous  NIFEdipine (PROCARDIA XL) extended release tablet 60 mg, Daily  furosemide (LASIX) injection 40 mg, Once  lactated ringers bolus bolus 500 mL, PRN   Or  lactated ringers bolus bolus 1,000 mL, PRN  sodium chloride flush 0.9 % injection 5-40 mL, 2 times per day  sodium chloride flush 0.9 % injection 5-40 mL, PRN  0.9 % sodium chloride infusion, PRN  ondansetron (ZOFRAN) injection 4 mg, Q6H PRN  benzocaine-menthol (DERMOPLAST) 20-0.5 % spray, PRN  docusate sodium (COLACE) capsule 100 mg, BID  oxyCODONE (ROXICODONE) immediate release tablet 5 mg, Q4H PRN   Or  oxyCODONE (ROXICODONE) immediate release tablet 10 mg, Q4H PRN  metoprolol succinate (TOPROL XL) extended release tablet 50 mg, Daily  acetaminophen (TYLENOL) tablet 650 mg, Q4H PRN  ibuprofen (ADVIL;MOTRIN) tablet 800 mg, Q8H PRN  simethicone (MYLICON) chewable tablet 80 mg, Q6H PRN        Allergies:  Diflucan [fluconazole] and Zithromax [azithromycin]     Review of Systems:   Negative except as noted above.      Objective   PHYSICAL EXAM:    Vitals:    12/28/23 0745   BP: (!) 165/77   Pulse: 81   Resp: 16   Temp: 98.8F   SpO2: 91% on room air         General: Obese adult female lying in bed.  HEENT: Normocephalic, atraumatic, non-icteric, hearing intact, nares normal, mucous membranes moist.  Neck: No appreciable JVD.  Heart: Regular rate and rhythm. Normal S1 and S2. Grade II/VI holosystolic murmur. No rubs or gallops.  Lungs: Normal respiratory effort. Clear to auscultation  as needed. In addition, I have reinforced the need for patient directed risk factor modification. All questions and concerns were addressed to the patient/family. Alternatives to my treatment were discussed. The note was completed using EMR. Every effort was made to ensure accuracy; however, inadvertent computerized transcription errors may be present.

## 2023-12-28 NOTE — PROGRESS NOTES
Spoke with Dr. Ayden Rivera via telephone to make her aware of patient blood pressure 163/81 with repeat blood pressure of 163/92. Reviewed blood pressure medications that were given this AM as well as lasix 40mg IV. States to contact cardiology regarding blood pressures for next steps/new orders. Page to Dr. Tatyana Young placed as well. Awaiting call back.

## 2023-12-28 NOTE — PROGRESS NOTES
Checked back with pt on her chest tightness following Mylicon. Pt states she feels better and was able to dose off to sleep.

## 2023-12-28 NOTE — PROGRESS NOTES
Spoke with Pharmacist to verify MgSO4 able to be ran alone without LR mainlined d/t Dr. Noemi Drummond discontinuing maintenance fluids. States MgSO4 can be ran alone.

## 2023-12-28 NOTE — PROGRESS NOTES
Department of Obstetrics and Gynecology  Labor and Delivery  Attending Post Partum Progress Note    HD # 1, POD # 6, s/p R C/S, CHTN w/ superimposed pre-eclampsia with severe features    SUBJECTIVE:  Pt without complaints, pain controlled, tolerating po, lochia wnl,  the patient is currently pumping to breastfeed. Denies shortness of breath. Reports lower extremity edema improving. OBJECTIVE:      Vitals:  Vitals:    23 1123   BP: (!) 163/92   Pulse: 86   Resp:    Temp:    SpO2: 96%     Input not recorded until this am  Output: 3700 cc    RRR CTAB  ABDOMEN:  soft, non-distended, non-tender, + BS  FF below umbilicus  Incision: c/d/i with steri-strips  EXT: +  edema    DATA:    CBC:    Lab Results   Component Value Date/Time    WBC 7.9 2023 07:41 AM    HGB 9.3 2023 07:41 AM    HCT 27.9 2023 07:41 AM     2023 07:41 AM     2015 12:00 AM       ASSESSMENT & PLAN:    29 y.o.   OB History          4    Para   3    Term   1       0    AB   1    Living   3         SAB   1    IAB   0    Ectopic   0    Molar        Multiple   0    Live Births   3             s/p C/S pod# 6.  1. CHTN/pre-eclampsia:  BPs not well controlled on Metoprolol 50 mg daily, Procardia 60 mg XL daily. Still requiring IV Labetalol . On MgSO4 for seizure prophylaxis. Continue to monitor BPs closely. Recommend continued hospitalization until BPs controlled on oral medication. 2. Appreciate  Cardiology consult/input. Diuresing well. Patients symptoms of shortness of breath have resolved.

## 2023-12-28 NOTE — PROGRESS NOTES
Spoke with Dr. Juana Olivares via telephone to make him aware of patient blood pressures 168/80 with repeat blood pressure of 165/77. No new orders received at this time.

## 2023-12-28 NOTE — PROGRESS NOTES
Report received from JAS Green RN. Bedside report given. Introduced myself to pt as her RN for the night. I put my name and phone number on the white board and verified pt knows how to use her room phone to get a hold of me. Pt was given her plan of care for the night. Call light within reach. Bed in lowest position and wheels are locked. Pt verbalized understanding and denies any further needs at this time.

## 2023-12-28 NOTE — PROGRESS NOTES
Dr. Jer Hansen update on pt's most recent BP readings of 165/92 and 162/88 and that pt denies any symptoms at this time. Physician also informed that pt was up out of bed between assessments and that a comfort pad was added to pt's bed and lights dimmed to allow for better rest. No new orders at this time. Pt to remain Q 4 hr vitals overnight.

## 2023-12-28 NOTE — PROGRESS NOTES
Upon entering room, pt states she is still having upper R chest tightness and says she can feel a \"lump\". This RN palpated area and also noted lump. Pt states that this \"limp\" has happened a few times in the past and says that she just tries to calm herself down when it happens. After talking with pt, plan to give PO Mylicon in attempt to treat. Charge RN updated on new developments.

## 2023-12-28 NOTE — PROGRESS NOTES
Dr. Daly Salvador called with update on pt's most recent BP readings of 171/81 and 173/71. Physician does not plan to treat at this time. No new orders.

## 2023-12-28 NOTE — PROGRESS NOTES
Dr. Gwendloyn Galeazzi at bedside at this time to evaluate patient. Increased Lasix order to 40mg this AM.  Blood pressure taken while at bedside 166/81. States no need to recheck in 15 minutes. Will recheck with next MgSO4 check.

## 2023-12-28 NOTE — PLAN OF CARE
Problem: Pain  Goal: Verbalizes/displays adequate comfort level or baseline comfort level  Outcome: Progressing  Flowsheets (Taken 12/27/2023 1952)  Verbalizes/displays adequate comfort level or baseline comfort level:   Encourage patient to monitor pain and request assistance   Assess pain using appropriate pain scale   Administer analgesics based on type and severity of pain and evaluate response   Implement non-pharmacological measures as appropriate and evaluate response     Problem: Chronic Conditions and Co-morbidities  Goal: Patient's chronic conditions and co-morbidity symptoms are monitored and maintained or improved  Outcome: Progressing   Pt's BP remains elevated. Pt denies correlating symptoms.

## 2023-12-28 NOTE — PROGRESS NOTES
Spoke with Dr. Sherryle Moat via telephone to make her aware of blood pressures 169/79, 163/77 and 160/75. States to contact Dr. Noemi Drummond for evaluation.

## 2023-12-28 NOTE — PROGRESS NOTES
Spoke with Dr. Maico Feliciano via telephone to make her aware of blood pressures 169/78 with repeat of 164/70. No new orders at this time. Will continue to monitor.

## 2023-12-28 NOTE — PROGRESS NOTES
Pt called out @ 2105 requesting RN at bedside. Upon entering room, pt states she is having lower abdominal and R upper chest tightness that came on suddenly while lying on her R side attempting to rest. Charge RN at pt bedside as well. Chest sounds auscultated by Charge RN to clear and unlabored, and BP assess by this RN to 161/74. Pt denies any other symptoms. Pt given PRN PO Motrin. Continue to monitor.

## 2023-12-28 NOTE — PROGRESS NOTES
Call placed to Dr. Major Caldwell with update on pt. Physician informed of pt's \"chest tightness\" episode, interventions performed, and that pt is feeling better at this time. No new orders.

## 2023-12-28 NOTE — PROGRESS NOTES
Spoke with Dr. Yudy Leonardo via telephone to make him aware of patient blood pressures 163/81 and 163/92. Order received for labetalol 10mg IVP x1 dose. Will continue to monitor.

## 2023-12-29 VITALS
RESPIRATION RATE: 16 BRPM | SYSTOLIC BLOOD PRESSURE: 142 MMHG | DIASTOLIC BLOOD PRESSURE: 74 MMHG | WEIGHT: 285.8 LBS | OXYGEN SATURATION: 88 % | HEART RATE: 83 BPM | BODY MASS INDEX: 43.46 KG/M2 | TEMPERATURE: 98.3 F

## 2023-12-29 LAB
ANION GAP SERPL CALCULATED.3IONS-SCNC: 12 MMOL/L (ref 3–16)
BUN SERPL-MCNC: 9 MG/DL (ref 7–20)
CALCIUM SERPL-MCNC: 7.5 MG/DL (ref 8.3–10.6)
CHLORIDE SERPL-SCNC: 98 MMOL/L (ref 99–110)
CO2 SERPL-SCNC: 26 MMOL/L (ref 21–32)
CREAT SERPL-MCNC: 0.7 MG/DL (ref 0.6–1.1)
GFR SERPLBLD CREATININE-BSD FMLA CKD-EPI: >60 ML/MIN/{1.73_M2}
GLUCOSE SERPL-MCNC: 128 MG/DL (ref 70–99)
MAGNESIUM SERPL-MCNC: 4.4 MG/DL (ref 1.8–2.4)
POTASSIUM SERPL-SCNC: 3.8 MMOL/L (ref 3.5–5.1)
SODIUM SERPL-SCNC: 136 MMOL/L (ref 136–145)

## 2023-12-29 PROCEDURE — 6370000000 HC RX 637 (ALT 250 FOR IP): Performed by: OBSTETRICS & GYNECOLOGY

## 2023-12-29 PROCEDURE — 99233 SBSQ HOSP IP/OBS HIGH 50: CPT | Performed by: INTERNAL MEDICINE

## 2023-12-29 PROCEDURE — 83735 ASSAY OF MAGNESIUM: CPT

## 2023-12-29 PROCEDURE — 80048 BASIC METABOLIC PNL TOTAL CA: CPT

## 2023-12-29 PROCEDURE — 36415 COLL VENOUS BLD VENIPUNCTURE: CPT

## 2023-12-29 PROCEDURE — 6360000002 HC RX W HCPCS: Performed by: OBSTETRICS & GYNECOLOGY

## 2023-12-29 PROCEDURE — 2580000003 HC RX 258: Performed by: OBSTETRICS & GYNECOLOGY

## 2023-12-29 PROCEDURE — 6370000000 HC RX 637 (ALT 250 FOR IP): Performed by: INTERNAL MEDICINE

## 2023-12-29 RX ORDER — METOPROLOL SUCCINATE 50 MG/1
50 TABLET, EXTENDED RELEASE ORAL DAILY
Qty: 30 TABLET | Refills: 3 | Status: SHIPPED | OUTPATIENT
Start: 2023-12-30

## 2023-12-29 RX ORDER — NIFEDIPINE 30 MG/1
60 TABLET, EXTENDED RELEASE ORAL DAILY
Qty: 30 TABLET | Refills: 3 | Status: SHIPPED | OUTPATIENT
Start: 2023-12-30

## 2023-12-29 RX ORDER — FUROSEMIDE 20 MG/1
20 TABLET ORAL DAILY
Status: DISCONTINUED | OUTPATIENT
Start: 2023-12-29 | End: 2023-12-29 | Stop reason: HOSPADM

## 2023-12-29 RX ADMIN — FUROSEMIDE 20 MG: 20 TABLET ORAL at 09:17

## 2023-12-29 RX ADMIN — NIFEDIPINE 60 MG: 30 TABLET, FILM COATED, EXTENDED RELEASE ORAL at 08:13

## 2023-12-29 RX ADMIN — IBUPROFEN 800 MG: 800 TABLET, FILM COATED ORAL at 03:28

## 2023-12-29 RX ADMIN — METOPROLOL SUCCINATE 50 MG: 50 TABLET, EXTENDED RELEASE ORAL at 08:12

## 2023-12-29 RX ADMIN — MAGNESIUM SULFATE HEPTAHYDRATE 2000 MG/HR: 40 INJECTION, SOLUTION INTRAVENOUS at 03:07

## 2023-12-29 RX ADMIN — Medication 10 ML: at 08:13

## 2023-12-29 ASSESSMENT — PAIN SCALES - GENERAL: PAINLEVEL_OUTOF10: 4

## 2023-12-29 ASSESSMENT — PAIN DESCRIPTION - LOCATION: LOCATION: HEAD

## 2023-12-29 NOTE — DISCHARGE INSTRUCTIONS
Please take the attached lab slips and have your labs checked at a [facility] in one week. We will arrange for follow-up with the [facility] in 1-2 weeks. Learning About Preeclampsia After Childbirth  What is preeclampsia? Preeclampsia is high blood pressure and signs of organ damage, such as protein in the urine, usually after 20 weeks of pregnancy. If it's not treated, preeclampsia can harm you or your baby. Severe preeclampsia can lead to dangerous seizures (eclampsia). When preeclampsia affects the liver, it can cause HELLP syndrome, a blood-clotting and bleeding problem. HELLP can come on quickly and can be dangerous. This is why your doctor checks you and your baby often. Preeclampsia usually goes away after the baby is born. But symptoms may last or get worse after delivery. In rare cases, symptoms may not show up until days or even weeks after childbirth. What are the symptoms? Mild preeclampsia usually doesn't cause symptoms. But it may cause rapid weight gain and sudden swelling of the hands and face. Severe preeclampsia can cause symptoms such as a severe headache, vision problems, and trouble breathing. It also can cause belly pain. And you may urinate less than usual.  What can you expect after you've had preeclampsia? In the hospital  After the baby and the placenta are delivered, preeclampsia usually starts to get better. Most people get better in the first few days after childbirth. After having preeclampsia, you still have a risk of seizures for a day or more after childbirth. (In very rare cases, seizures happen later on.) So your doctor may have you take magnesium sulfate for a day or more to prevent seizures. You may also take medicine to lower your blood pressure. When you go home  Your blood pressure will most likely return to normal a few days after delivery.  Your doctor will want to check your blood pressure sometime in the first week after you

## 2023-12-29 NOTE — LACTATION NOTE
Lactation Progress Note      Data:    F/U consult for multip re-admitted 6 days po due to high BP. MOB has been set up with a breast pump and is using q3h. MOB did not breastfeed other infants but is going to pump & bottle feed current infant. Action:    Introduced self & ensured name & lactation # is on whiteboard in room. Reviewed breast pump education; how to use, what to expect, how often to use, how to clean breast pump parts, how the breasts work to make milk, and protecting milk supply. All questions answered & mother encouraged to call for lactation assistance as needed. Response:    MOB verbalized an understanding of education provided and will call for assistance as needed.

## 2023-12-29 NOTE — PROGRESS NOTES
Call placed to Dr. Desmond Redmond with update on pt. Physician informed that at this point, we have not heard back from Cardiology following her wish to defer to them on previous shift. Dr. Desmond Redmond updated on pt's 2000 and 2100 MgSO4 check BPs. No new orders at this time. Confirmed plan to discontinue MgSO4 @ 24hr lonny in AM at 0650.

## 2023-12-29 NOTE — PROGRESS NOTES
[facility]   PROGRESS NOTE  [phone]      Attending Physician: Curtis Bolden MD  Reason for Consultation/Chief Complaint: Pulmonary edema, hypertensive urgency     Subjective     Received 40 mg IV lasix yesterday and nifedipine dose was increased to 60 mg daily.    Overall, BP trend has improved.    Patient reports that she is feeling better and denies chest pain and shortness of breath.      CURRENT Medications:  furosemide (LASIX) tablet 20 mg, Daily  magnesium sulfate (94317 mg/500mL infusion), Continuous  NIFEdipine (PROCARDIA XL) extended release tablet 60 mg, Daily  labetalol (NORMODYNE;TRANDATE) injection 10 mg, Once PRN  lactated ringers bolus bolus 500 mL, PRN   Or  lactated ringers bolus bolus 1,000 mL, PRN  sodium chloride flush 0.9 % injection 5-40 mL, 2 times per day  sodium chloride flush 0.9 % injection 5-40 mL, PRN  0.9 % sodium chloride infusion, PRN  ondansetron (ZOFRAN) injection 4 mg, Q6H PRN  benzocaine-menthol (DERMOPLAST) 20-0.5 % spray, PRN  docusate sodium (COLACE) capsule 100 mg, BID  oxyCODONE (ROXICODONE) immediate release tablet 5 mg, Q4H PRN   Or  oxyCODONE (ROXICODONE) immediate release tablet 10 mg, Q4H PRN  metoprolol succinate (TOPROL XL) extended release tablet 50 mg, Daily  acetaminophen (TYLENOL) tablet 650 mg, Q4H PRN  ibuprofen (ADVIL;MOTRIN) tablet 800 mg, Q8H PRN  simethicone (MYLICON) chewable tablet 80 mg, Q6H PRN        Allergies:  Diflucan [fluconazole] and Zithromax [azithromycin]     Review of Systems:   Negative except as noted above.      Objective   PHYSICAL EXAM:    Vitals:    12/29/23 1001   BP: 136/67   Pulse: 77   Resp: 16   Temp: 98.3F   SpO2: 88%      Weight - Scale: 129.6 kg (285 lb 12.8 oz)    General: Obese adult female lying in bed.  HEENT: Normocephalic, atraumatic, non-icteric, hearing intact, nares normal, mucous membranes moist.  Neck: No appreciable JVD.  Heart: Regular rate and rhythm. Normal S1 and S2. Grade II/VI  cardiac risk factors and adjusted pharmacologic treatment as needed. In addition, I have reinforced the need for patient directed risk factor modification. All questions and concerns were addressed to the patient/family. Alternatives to my treatment were discussed. The note was completed using EMR. Every effort was made to ensure accuracy; however, inadvertent computerized transcription errors may be present.

## 2023-12-29 NOTE — ADT AUTH CERT
Subscriber Details  Hospital Account [de-identified]  [facility] Subscriber Name/Sex/Relation Subscriber  Subscriber Address/Phone Subscriber Emp/Emp Phone   1. Enrique Rojas   [id number] JAIRO HAINESterrosalinda - Female   (Self) 1990 [address] [facility] [address]) OTHER   [phone]     Utilization Reviews       Physician advisor inpt letter by Staci Beck RN  Last Updated by Staci Beck RN on 2023 1621     Review Status Created By   In Primary Staci Beck RN       Review Type   --      Criteria Review   Name: Jena Macedo   : 1990   CSN: [id number]   INSURANCE: [employer]    Date of admission: 23    Current status: Inpatient    We recommend that patient's status is APPROPRIATE     Rationale: Inpatient is appropriate for this high risk pt due to high acuity and intensity requiring further monitoring, evaluation and treatment. Clinical summary 27-year-old female 6 days post- admitted with hypertensive urgency/pre-eclampsia and pulmonary edema. Her pulmonary edema and acute heart failure are likely secondary to her uncontrolled hypertension. A TTE obtained yesterday showed a low normal LVEF of 50% with moderate LV dilatation, grade 2 diastolic function, mild-moderate LVH, normal RV size and systolic function, mildly enlarged left atrium, and mild-moderate mitral regurgitation. There was noted to be a trace  pericardial effusion as well. s/p C/S pod# 6.  1. CHTN/pre-eclampsia: BPs not well controlled on Metoprolol 50 mg daily, Procardia 60 mg XL daily. Still requiring IV Labetalol . On MgSO4 for seizure prophylaxis. Continue to monitor BPs closely.   Vitals Elevated BP  Labs and Imaging See above  Status decision based on clinical judgment and Commercial Utilization criteria, e.g., [employer], [employer]            by Staci Beck RN  Last Updated by Staci Beck RN on 2023 1350     Review Status Created By   In following transfer was 141/80.  She did become tearful and upset while discussing her condition while I was in the room earlier and a repeat BP was in the 190s/110s.  Will plan to repeat BP once she is more calm and can give additional 10-20 mg IV labetalol as needed for BP >160/100.  3. Can resume metoprolol succinate 50 mg daily.  4. Monitor I/Os, obtain daily standing weights.  5. Recommend 2L per day fluid restriction and low sodium diet.  6. Trend BMP and magnesium levels while diuresing and replete electrolytes as needed to maintain K>4 and Mg>2.  7. Plan to repeat TTE in 1-2 years to monitor for progression of mitral regurgitation.           MEDICATIONS:  LR @ 75ml/hr  Lasix 40mg iv bid

## 2023-12-29 NOTE — PROGRESS NOTES
Call received from Dr. Irasema Walker with Cardiology. Physician updated that pt's most recent BP following previous perfect serve message sent was 147/70. Order received to give 10 mg IV Labetalol once, if pt BP goes over 160/100. Physician okay with discontinuing MgSO4 from his stand point, if OB agrees. No additional orders at this time. Dr. Skye Aguilar updated.

## 2023-12-29 NOTE — LACTATION NOTE
Lactation Progress Note      Data:   F/u during lactation rounds with multip who plans to exclusively pump and bottle feed for baby. Mother is 7 days po, was readmitted for elevated blood pressures, planning to go home this afternoon. Mother states she has not pumped today, and is waiting to pump until she goes home. States that the last time she did pump, she collecting about 100 mL's of EBM. Action: Introduced self as [address] on for the day and offered support. Educated on how milk production works and the importance to pump frequently to stimulate the breasts to continue to make milk. Educated on how going long stretches of time between pumping sessions negatively impacts milk production and supply and puts mother at risk for stasis of milk, plugged ducts, engorgement, mastitis, and decreased and/or loss of milk supply. Encouraged mother to pump frequently q2-3h for 15 minutes, and a minimum of 8x per day. Reviewed care of breasts if s/s of clogged ducts, mastitis or engorgement were to occur and when to seek f/u. Pumping education reviewed and provided breastfeeding guide booklet highlighting areas for pumping mothers including how milk production works, common concerns section, how to use and clean pump, how to collect, store, and prepare EBM per guidelines. Reviewed how flanges should fit, and how to adjust pump suction as needed, explaining that pumping should not cause discomfort or pain. Name and number provided on whiteboard. Encouraged to call for f/u lactation support and assistance with pumping as needed. Response: Verbalized understanding of teaching provided. Will call for f/u prn.

## 2023-12-29 NOTE — PLAN OF CARE
Problem: Pain  Goal: Verbalizes/displays adequate comfort level or baseline comfort level  12/29/2023 1034 by Umair Lozano RN  Outcome: Progressing  12/28/2023 2309 by Huong Connor RN  Outcome: Progressing  Flowsheets (Taken 12/28/2023 2002)  Verbalizes/displays adequate comfort level or baseline comfort level:   Encourage patient to monitor pain and request assistance   Assess pain using appropriate pain scale   Administer analgesics based on type and severity of pain and evaluate response   Implement non-pharmacological measures as appropriate and evaluate response     Problem: Chronic Conditions and Co-morbidities  Goal: Patient's chronic conditions and co-morbidity symptoms are monitored and maintained or improved  12/29/2023 1034 by Umair Lozano RN  Outcome: Progressing  12/28/2023 2309 by Huong Connor RN  Outcome: Progressing  Flowsheets (Taken 12/28/2023 2002)  Care Plan - Patient's Chronic Conditions and Co-Morbidity Symptoms are Monitored and Maintained or Improved: Monitor and assess patient's chronic conditions and comorbid symptoms for stability, deterioration, or improvement

## 2023-12-29 NOTE — PLAN OF CARE
Problem: Pain  Goal: Verbalizes/displays adequate comfort level or baseline comfort level  Outcome: Progressing  Flowsheets (Taken 12/28/2023 2002)  Verbalizes/displays adequate comfort level or baseline comfort level:   Encourage patient to monitor pain and request assistance   Assess pain using appropriate pain scale   Administer analgesics based on type and severity of pain and evaluate response   Implement non-pharmacological measures as appropriate and evaluate response  Pt states her headache is gone following PRN Tylenol. Problem: Chronic Conditions and Co-morbidities  Goal: Patient's chronic conditions and co-morbidity symptoms are monitored and maintained or improved  Outcome: Progressing  Flowsheets (Taken 12/28/2023 2002)  Care Plan - Patient's Chronic Conditions and Co-Morbidity Symptoms are Monitored and Maintained or Improved: Monitor and assess patient's chronic conditions and comorbid symptoms for stability, deterioration, or improvement  Pt's BP is stabilized at this time.

## 2023-12-29 NOTE — PROGRESS NOTES
New perfect serve sent to Dr. Christophe Jacinto with Cardiology: \"Pt continues to have elevated BP. Most recent 163/87 and 160/92. OBGYN wants to defer to you for potential new orders? Thanks!  Need Callback: NEEDS CALLBACK \"

## 2023-12-29 NOTE — PROGRESS NOTES
Spoke with Dr. Td Cruz via telephone for blood pressure orders after MgSO4 turned off. States to take blood pressures q1h until noon and then will re-evaluate.

## 2023-12-30 NOTE — DISCHARGE SUMMARY
Admitted: 12/27/2023    Discharged: 12/29/2023    Admitting Dx: post partum pre-eclampsia. Fluid overload    Discharge Dx: Same    Hospital Course: Patient was started on magnesium sulfate for seizure prophylaxis. She was also started on antihypertensive medication. She had a cardiology consult during her stay and received diuretics for fluid overload. Her echocardiogram revealed low normal left ventricular systolic function and mitral regurgitation and she is to schedule a follow up with the cardiologist in one week. Her blood pressures improved with diuresis and medication. Discharge Meds: Procardia XL 60 mg qd and metoprolol succinate 50 mg qd      Follow-up Care/Instructions:  To return for BP check in one week

## 2024-01-04 NOTE — PROGRESS NOTES
ejection fraction (HFpEF) ([facility])  -12/2023: admitted post partum and noted to have bilateral pleural effusions and hypertensive urgency. She was diuresed and BP controlled; sxs resolved  -Echo showed; LVEF 50%; moderately dilated LV with mild-mod LVH; grade II diastolic dysfunction  -remains on low dose diuretic and BB  -low sodium diet, fluid restriction, daily weights discussed    6. LE edema  -largely secondary to CCB (she is reluctant to change since her BP is so well controlled)  -continue lasix 20 mg daily as needed  -compression hose and elevating legs discussed    Plan:  Change Lasix to 20 mg to daily as needed  Continue Toprol, lasix and procardia  Plan to repeat echo in 1 year to to reevaluate LVEF and mitral regurgitation  Discussed low-fat/low sodium diet, monitoring of daily weights, fluid restriction, worsening signs and symptoms of heart failure and when to call, and the importance of regular exercise and activity.  Labs from 1/1/24 reviewed ()  Will change her primary cardiac care to [facility] (closer to home); will arrange appointment with Dr. Ware  Follow up with Dr. Ware to establish outpt care in 1-2 months  (Dr. Ware and his office have been notified)    Return in about 1 month (around 2/9/2024) for 1-2 with Dr. Ware (to establish care with [facility]).     Thanks for allowing me to participate in the care of this patient.      Diane Enzweiler, APRN-CNP  [facility]  [address]  [place] [address]  Office: [phone]  Fax: [phone]      Electronically signed by DIANE M ENZWEILER, APRN - CNP on 1/9/2024 at 10:53 AM

## 2024-01-09 ENCOUNTER — OFFICE VISIT (OUTPATIENT)
Dept: CARDIOLOGY CLINIC | Age: 34
End: 2024-01-09
Payer: COMMERCIAL

## 2024-01-09 ENCOUNTER — TELEPHONE (OUTPATIENT)
Dept: CARDIOLOGY CLINIC | Age: 34
End: 2024-01-09

## 2024-01-09 VITALS
SYSTOLIC BLOOD PRESSURE: 126 MMHG | DIASTOLIC BLOOD PRESSURE: 82 MMHG | BODY MASS INDEX: 42.74 KG/M2 | OXYGEN SATURATION: 97 % | HEIGHT: 68 IN | WEIGHT: 282 LBS | HEART RATE: 88 BPM

## 2024-01-09 DIAGNOSIS — O11.9 CHRONIC HYPERTENSION WITH SUPERIMPOSED PRE-ECLAMPSIA: ICD-10-CM

## 2024-01-09 DIAGNOSIS — I10 PRIMARY HYPERTENSION: Primary | ICD-10-CM

## 2024-01-09 DIAGNOSIS — I34.0 NONRHEUMATIC MITRAL VALVE REGURGITATION: ICD-10-CM

## 2024-01-09 DIAGNOSIS — I50.31 ACUTE HEART FAILURE WITH PRESERVED EJECTION FRACTION (HFPEF) (HCC): ICD-10-CM

## 2024-01-09 DIAGNOSIS — R06.83 LOUD SNORING: ICD-10-CM

## 2024-01-09 PROBLEM — I50.32 CHRONIC HEART FAILURE WITH PRESERVED EJECTION FRACTION (HCC): Status: ACTIVE | Noted: 2024-01-09

## 2024-01-09 PROCEDURE — G8417 CALC BMI ABV UP PARAM F/U: HCPCS | Performed by: NURSE PRACTITIONER

## 2024-01-09 PROCEDURE — 4004F PT TOBACCO SCREEN RCVD TLK: CPT | Performed by: NURSE PRACTITIONER

## 2024-01-09 PROCEDURE — G8484 FLU IMMUNIZE NO ADMIN: HCPCS | Performed by: NURSE PRACTITIONER

## 2024-01-09 PROCEDURE — 3079F DIAST BP 80-89 MM HG: CPT | Performed by: NURSE PRACTITIONER

## 2024-01-09 PROCEDURE — 3074F SYST BP LT 130 MM HG: CPT | Performed by: NURSE PRACTITIONER

## 2024-01-09 PROCEDURE — 99214 OFFICE O/P EST MOD 30 MIN: CPT | Performed by: NURSE PRACTITIONER

## 2024-01-09 PROCEDURE — G8427 DOCREV CUR MEDS BY ELIG CLIN: HCPCS | Performed by: NURSE PRACTITIONER

## 2024-01-09 PROCEDURE — 1111F DSCHRG MED/CURRENT MED MERGE: CPT | Performed by: NURSE PRACTITIONER

## 2024-01-09 RX ORDER — TRAMADOL HYDROCHLORIDE 50 MG/1
50 TABLET ORAL EVERY 8 HOURS PRN
COMMUNITY
Start: 2024-01-08

## 2024-01-09 RX ORDER — HYDROXYZINE PAMOATE 25 MG/1
25 CAPSULE ORAL 4 TIMES DAILY PRN
COMMUNITY
Start: 2024-01-01

## 2024-01-09 RX ORDER — FUROSEMIDE 20 MG/1
20 TABLET ORAL DAILY
COMMUNITY
Start: 2023-12-30

## 2024-01-09 RX ORDER — NIFEDIPINE 60 MG/1
60 TABLET, EXTENDED RELEASE ORAL 2 TIMES DAILY
COMMUNITY
End: 2024-01-09 | Stop reason: SDUPTHER

## 2024-01-09 RX ORDER — NIFEDIPINE 60 MG/1
60 TABLET, EXTENDED RELEASE ORAL 2 TIMES DAILY
Qty: 60 TABLET | Refills: 5 | Status: SHIPPED | OUTPATIENT
Start: 2024-01-09

## 2024-01-09 NOTE — PATIENT INSTRUCTIONS
Change Lasix to 20 mg daily as needed    Continue other medications    Follow up with Dr. Brayden Ware at [facility]

## 2024-01-09 NOTE — TELEPHONE ENCOUNTER
Pt was seen at [facility] today NPDE.  Needs to follow up at [facility] in 1-2 months due to commute.

## 2024-01-09 NOTE — TELEPHONE ENCOUNTER
Called and talked to Blessing, she is now scheduled for 02/06/2024 arriving at 9:30 am at [facility] with Chaz Campos

## 2024-01-10 ENCOUNTER — TELEPHONE (OUTPATIENT)
Dept: CARDIOLOGY CLINIC | Age: 34
End: 2024-01-10

## 2024-01-10 NOTE — TELEPHONE ENCOUNTER
----- Message from Diane M Enzweiler, APRN - CNP sent at 1/9/2024 10:21 AM EST -----  Yaritza: Talked to Dr. Ware and this young lady lives in [place] and is wanting to transfer her care to [facility]. She will need follow up to establish with Dr. Ware in 1-2 months. You can look at my note when I get it done today (may be a long while)    Thanks!    Cinthya

## 2024-02-16 NOTE — PROGRESS NOTES
of Dr Chaz MD by Lupe Ly RN.   Physician Attestation: The scribes documentation has been prepared under my direction and personally reviewed by me in its entirety.   I confirm that the note above accurately reflects all work, treatment, procedures, and medical decision making performed by me.   All portions of the note including but not limited to the chief complaint, history of present illness, physical exam, assessment and plan/medical decision making were personally reviewed, edited, and updated on the day of the visit.

## 2024-02-20 ENCOUNTER — OFFICE VISIT (OUTPATIENT)
Dept: CARDIOLOGY CLINIC | Age: 34
End: 2024-02-20
Payer: COMMERCIAL

## 2024-02-20 VITALS
SYSTOLIC BLOOD PRESSURE: 140 MMHG | DIASTOLIC BLOOD PRESSURE: 84 MMHG | OXYGEN SATURATION: 96 % | WEIGHT: 283 LBS | HEIGHT: 68 IN | BODY MASS INDEX: 42.89 KG/M2

## 2024-02-20 DIAGNOSIS — E66.01 MORBID OBESITY WITH BMI OF 40.0-44.9, ADULT (HCC): ICD-10-CM

## 2024-02-20 DIAGNOSIS — I34.0 NONRHEUMATIC MITRAL VALVE REGURGITATION: ICD-10-CM

## 2024-02-20 DIAGNOSIS — I10 ESSENTIAL HYPERTENSION: Primary | ICD-10-CM

## 2024-02-20 PROCEDURE — 3077F SYST BP >= 140 MM HG: CPT | Performed by: INTERNAL MEDICINE

## 2024-02-20 PROCEDURE — G8428 CUR MEDS NOT DOCUMENT: HCPCS | Performed by: INTERNAL MEDICINE

## 2024-02-20 PROCEDURE — G8484 FLU IMMUNIZE NO ADMIN: HCPCS | Performed by: INTERNAL MEDICINE

## 2024-02-20 PROCEDURE — 3079F DIAST BP 80-89 MM HG: CPT | Performed by: INTERNAL MEDICINE

## 2024-02-20 PROCEDURE — 4004F PT TOBACCO SCREEN RCVD TLK: CPT | Performed by: INTERNAL MEDICINE

## 2024-02-20 PROCEDURE — 99214 OFFICE O/P EST MOD 30 MIN: CPT | Performed by: INTERNAL MEDICINE

## 2024-02-20 PROCEDURE — G8417 CALC BMI ABV UP PARAM F/U: HCPCS | Performed by: INTERNAL MEDICINE

## 2025-03-10 ENCOUNTER — APPOINTMENT (OUTPATIENT)
Dept: GENERAL RADIOLOGY | Age: 35
End: 2025-03-10
Payer: COMMERCIAL

## 2025-03-10 ENCOUNTER — HOSPITAL ENCOUNTER (EMERGENCY)
Age: 35
Discharge: HOME | End: 2025-03-10
Payer: COMMERCIAL

## 2025-03-10 VITALS
RESPIRATION RATE: 18 BRPM | TEMPERATURE: 98.4 F | OXYGEN SATURATION: 99 % | HEART RATE: 82 BPM | SYSTOLIC BLOOD PRESSURE: 151 MMHG | DIASTOLIC BLOOD PRESSURE: 85 MMHG | WEIGHT: 293 LBS | BODY MASS INDEX: 44.92 KG/M2

## 2025-03-10 DIAGNOSIS — S93.401A SPRAIN OF RIGHT ANKLE, UNSPECIFIED LIGAMENT, INITIAL ENCOUNTER: ICD-10-CM

## 2025-03-10 DIAGNOSIS — R03.0 ELEVATED BLOOD PRESSURE READING: ICD-10-CM
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
## 2025-03-10 DIAGNOSIS — M25.552 LEFT HIP PAIN: ICD-10-CM

## 2025-03-10 DIAGNOSIS — M25.571 ACUTE RIGHT ANKLE PAIN: ICD-10-CM

## 2025-03-10 DIAGNOSIS — M79.671 RIGHT FOOT PAIN: ICD-10-CM

## 2025-03-10 LAB — HCG UR QL: NEGATIVE

## 2025-03-10 PROCEDURE — 72100 X-RAY EXAM L-S SPINE 2/3 VWS: CPT

## 2025-03-10 PROCEDURE — 99284 EMERGENCY DEPT VISIT MOD MDM: CPT

## 2025-03-10 PROCEDURE — 73630 X-RAY EXAM OF FOOT: CPT

## 2025-03-10 PROCEDURE — 73610 X-RAY EXAM OF ANKLE: CPT

## 2025-03-10 PROCEDURE — 6370000000 HC RX 637 (ALT 250 FOR IP): Performed by: PHYSICIAN ASSISTANT

## 2025-03-10 PROCEDURE — 84703 CHORIONIC GONADOTROPIN ASSAY: CPT

## 2025-03-10 PROCEDURE — 73502 X-RAY EXAM HIP UNI 2-3 VIEWS: CPT

## 2025-03-10 RX ORDER — ACETAMINOPHEN 500 MG
1000 TABLET ORAL ONCE
Status: COMPLETED | OUTPATIENT
Start: 2025-03-10 | End: 2025-03-10

## 2025-03-10 RX ORDER — NAPROXEN 500 MG/1
500 TABLET ORAL 2 TIMES DAILY PRN
Qty: 30 TABLET | Refills: 0 | Status: SHIPPED | OUTPATIENT
Start: 2025-03-10

## 2025-03-10 RX ADMIN — ACETAMINOPHEN 1000 MG: 500 TABLET ORAL at 18:35

## 2025-03-10 ASSESSMENT — PAIN - FUNCTIONAL ASSESSMENT: PAIN_FUNCTIONAL_ASSESSMENT: ACTIVITIES ARE NOT PREVENTED

## 2025-03-10 ASSESSMENT — PAIN SCALES - GENERAL
PAINLEVEL_OUTOF10: 5
PAINLEVEL_OUTOF10: 5

## 2025-03-10 ASSESSMENT — PAIN DESCRIPTION - ORIENTATION
ORIENTATION: RIGHT;LEFT
ORIENTATION: RIGHT

## 2025-03-10 ASSESSMENT — PAIN DESCRIPTION - DESCRIPTORS
DESCRIPTORS: ACHING
DESCRIPTORS: ACHING

## 2025-03-10 ASSESSMENT — PAIN DESCRIPTION - LOCATION
LOCATION: ANKLE;FOOT;LEG
LOCATION: FOOT;HIP

## 2025-03-10 ASSESSMENT — PAIN DESCRIPTION - PAIN TYPE: TYPE: ACUTE PAIN

## 2025-03-10 ASSESSMENT — PAIN DESCRIPTION - ONSET: ONSET: ON-GOING

## 2025-03-10 ASSESSMENT — PAIN DESCRIPTION - FREQUENCY: FREQUENCY: CONTINUOUS

## 2025-03-10 NOTE — ED PROVIDER NOTES
FOOT RIGHT (MIN 3 VIEWS)   Final Result   Soft tissue swelling without evidence of acute fracture or dislocation.         XR HIP LEFT (2-3 VIEWS)   Final Result   Mild osteoarthritic changes left hip with no acute abnormality seen.         XR LUMBAR SPINE (2-3 VIEWS)   Final Result   Mild degenerative disc space narrowing throughout which is more prominent   with no acute abnormality seen.      Mild osteoarthritic changes of the facets inferiorly with no pars defects.           No results found.      PAST MEDICAL HISTORY      has a past medical history of ADHD (attention deficit hyperactivity disorder) and Hypertension.     EMERGENCY DEPARTMENT COURSE and DIFFERENTIAL DIAGNOSIS/MDM:   Vitals:    Vitals:    03/10/25 1730   BP: (!) 151/85   Pulse: 82   Resp: 18   Temp: 98.4 °F (36.9 °C)   TempSrc: Oral   SpO2: 99%   Weight: 134 kg (295 lb 6.7 oz)       Patient was given the following medications:  Medications   acetaminophen (TYLENOL) tablet 1,000 mg (1,000 mg Oral Given 3/10/25 1835)           Patient presents with right foot and ankle pain along with left hip pain after mechanical slip and fall today.  Patient was nontoxic, well appearing, with normal vital signs with the exception of hypertension 151/85.  On exam, has tenderness medial aspect of the right foot and ankle. Also with tenderness of the posterior left hip.  Will obtain imaging..    Records reviewed:  -     Differential diagnosis includes fx, dislocation, sprain, contusion, other    Xray left hip and lumbar spine with no acute abnormalities. Xray foot and ankle are negative.      Upon reevaluation, appears well.  FU with ortho for reeval.  Return for worsening          Chronic Conditions:       I am the Primary Clinician of Record.    Is this patient to be included in the SEP-1 Core Measure due to severe sepsis or septic shock?   No   Exclusion criteria - the patient is NOT to be included for SEP-1 Core Measure due to:  Infection is not

## 2025-03-11 NOTE — NURSING NOTE

## 2025-03-12 SDOH — HEALTH STABILITY: PHYSICAL HEALTH: ON AVERAGE, HOW MANY DAYS PER WEEK DO YOU ENGAGE IN MODERATE TO STRENUOUS EXERCISE (LIKE A BRISK WALK)?: 2 DAYS

## 2025-03-12 SDOH — HEALTH STABILITY: PHYSICAL HEALTH: ON AVERAGE, HOW MANY MINUTES DO YOU ENGAGE IN EXERCISE AT THIS LEVEL?: 30 MIN

## 2025-03-13 ENCOUNTER — OFFICE VISIT (OUTPATIENT)
Dept: ORTHOPEDIC SURGERY | Age: 35
End: 2025-03-13
Payer: COMMERCIAL

## 2025-03-13 VITALS — HEIGHT: 68 IN | BODY MASS INDEX: 44.41 KG/M2 | WEIGHT: 293 LBS

## 2025-03-13 DIAGNOSIS — S93.401A SPRAIN OF RIGHT ANKLE, UNSPECIFIED LIGAMENT, INITIAL ENCOUNTER: Primary | ICD-10-CM

## 2025-03-13 PROCEDURE — 99203 OFFICE O/P NEW LOW 30 MIN: CPT | Performed by: PHYSICIAN ASSISTANT

## 2025-03-13 PROCEDURE — L4361 PNEUMA/VAC WALK BOOT PRE OTS: HCPCS | Performed by: PHYSICIAN ASSISTANT

## 2025-03-13 RX ORDER — METHYLPREDNISOLONE 4 MG/1
TABLET ORAL
Qty: 1 KIT | Refills: 0 | Status: SHIPPED | OUTPATIENT
Start: 2025-03-13

## 2025-03-13 RX ORDER — CYCLOBENZAPRINE HCL 10 MG
10 TABLET ORAL 3 TIMES DAILY PRN
Qty: 60 TABLET | Refills: 0 | Status: SHIPPED | OUTPATIENT
Start: 2025-03-13 | End: 2025-04-02

## 2025-03-13 NOTE — PROGRESS NOTES
This dictation was done with Dragon dictation and may contain mechanical errors related to translation.    I have today reviewed with Blessing Yañez the clinically relevant, past medical history, medications, allergies, family history, social history, and Review Of Systems form the patient’s most recent history form & I have documented any details relevant to today's presenting complaints in my history below. Ms. Blessing Yañez's self-reported past medical history, medications, allergies, family history, social history, and Review Of Systems form has been scanned into the chart under the \"Media\" tab.    Subjective:  Blessing Yañez is a 34 y.o. who is here  as a new patient to [facility] orthopedics with a slip and fall on 3/10/2025 while she was at [employer] at lunchtime.  She works at home denies any previous problems with her right ankle lower back or left hip.  She was pushing a cart stepped and something with her right foot to the point where her foot slipped forward she landed on her left knee initially onto her left hip and is in lower back.  She initially hurt her foot and lateral ankle on her right leg.  She had some pain in her posterior hip and lateral hip that turned more into sharp radiating pain in the posterior buttocks all the way to her foot.  She went to [facility] emergency room had x-rays of her hip and ankle and comes here for further treatment 3 days later.      Patient Active Problem List   Diagnosis    Traumatic injury during pregnancy    History of     Chronic hypertension with superimposed pre-eclampsia    Hypertension, postpartum condition or complication    Chronic heart failure with preserved ejection fraction (HCC)           Current Outpatient Medications on File Prior to Visit   Medication Sig Dispense Refill    naproxen (NAPROSYN) 500 MG tablet Take 1 tablet by mouth 2 times daily as needed for Pain 30 tablet 0    Semaglutide-Weight Management (WEGOVY) 0.25 MG/0.5ML SOAJ SC injection Inject

## 2025-03-14 ENCOUNTER — TELEPHONE (OUTPATIENT)
Dept: ORTHOPEDIC SURGERY | Age: 35
End: 2025-03-14

## 2025-03-14 NOTE — TELEPHONE ENCOUNTER
General Question     Subject: BILLING   Patient and /or Facility Request: Blessing Yañez   Contact Number: [phone]      PATIENT REQUESTING TO SPEAK WITH FAY REGARDING BILLING STATES THEY SPOKE ABOUT THIS BEFORE    PLEASE CALL PATIENT AT THE ABOVE NUMBER.

## 2025-03-14 NOTE — TELEPHONE ENCOUNTER
Debra spoke with patient.  Per Debra patient spoke with her  and was told to use her regular insurance for her office visit.

## 2025-03-31 ENCOUNTER — TELEPHONE (OUTPATIENT)
Dept: ORTHOPEDIC SURGERY | Age: 35
End: 2025-03-31

## 2025-03-31 NOTE — TELEPHONE ENCOUNTER
General Question     Subject: CHIROPRACTOR??  Patient and /or Facility Request: PATIENT   Contact Number: [phone]       PATIENT WANTS TO BE ADVISED IF SHE SHOULD SEE A CHIROPRACTOR?

## 2025-04-03 ENCOUNTER — OFFICE VISIT (OUTPATIENT)
Dept: ORTHOPEDIC SURGERY | Age: 35
End: 2025-04-03

## 2025-04-03 VITALS — HEIGHT: 68 IN | WEIGHT: 293 LBS | BODY MASS INDEX: 44.41 KG/M2

## 2025-04-03 DIAGNOSIS — S93.401A SPRAIN OF RIGHT ANKLE, UNSPECIFIED LIGAMENT, INITIAL ENCOUNTER: Primary | ICD-10-CM

## 2025-04-03 DIAGNOSIS — S39.012A LOW BACK STRAIN, INITIAL ENCOUNTER: ICD-10-CM

## 2025-04-03 RX ORDER — PREDNISONE 10 MG/1
TABLET ORAL
Qty: 35 TABLET | Refills: 0 | Status: SHIPPED | OUTPATIENT
Start: 2025-04-03 | End: 2025-04-05

## 2025-04-04 NOTE — PROGRESS NOTES
and sensation intact distally.    Imaging:  Prior lumbar spine, left hip, right ankle radiographs were reviewed today.  There are no fractures or dislocations and joint spaces are maintained.    Assessment:  Right ankle sprain  Low back strain    Plan:  We discussed the diagnosis and treatment options.  She was transitioned from a boot to an ankle brace today.  She may resume weightbearing as tolerated and will use the brace for the next 2 to 3 weeks.  She was prescribed a steroid taper for her low back symptoms that radiate down her leg.  We discussed that given time and the steroids, her symptoms should continue to improve.  She will follow-up for repeat assessment in 2 to 3 weeks.        Procedures    Ankle Lace-Up Brace Wraptor / Tho / DJO     Patient was prescribed a Swedo Ankle Brace.  The left ankle will require stabilization / immobilization from this semi-rigid / rigid orthosis to improve their function.  The orthosis will assist in protecting the affected area, provide functional support and facilitate healing.    Patient was instructed to progress ambulation weight bearing as tolerated in the device.     The patient was educated and fit by a healthcare professional with expert knowledge and specialization in brace application while under the direct supervision of the treating physician.  Verbal and written instructions for the use of and application of this item were provided.   They were instructed to contact the office immediately should the brace result in increased pain, decreased sensation, increased swelling or worsening of the condition.        Total time spent on today's encounter was at least 23 minutes. This time included reviewing prior notes, radiographs, and lab results when available, reviewing history obtained by medical assistant, performing history and physical exam, reviewing tests/radiographs with the patient, counseling the patient, ordering medications or tests, documentation in

## 2025-04-17 ENCOUNTER — OFFICE VISIT (OUTPATIENT)
Dept: ORTHOPEDIC SURGERY | Age: 35
End: 2025-04-17
Payer: COMMERCIAL

## 2025-04-17 VITALS — BODY MASS INDEX: 44.41 KG/M2 | HEIGHT: 68 IN | WEIGHT: 293 LBS

## 2025-04-17 DIAGNOSIS — G57.02 PIRIFORMIS SYNDROME OF LEFT SIDE: Primary | ICD-10-CM

## 2025-04-17 PROCEDURE — 99213 OFFICE O/P EST LOW 20 MIN: CPT | Performed by: PHYSICIAN ASSISTANT

## 2025-04-18 NOTE — PROGRESS NOTES
This dictation was done with Dragon dictation and may contain mechanical errors related to translation.    I have today reviewed with Blessing Yañez the clinically relevant, past medical history, medications, allergies, family history, social history, and Review Of Systems form the patient’s most recent history form & I have documented any details relevant to today's presenting complaints in my history below. Ms. Blessing Yañez's self-reported past medical history, medications, allergies, family history, social history, and Review Of Systems form has been scanned into the chart under the \"Media\" tab.    Subjective:  Blessing Yañez is a 34 y.o. who is here in follow-up for some pain in her left hip and her back as well as her right ankle.  She has been wearing a brace for the ankle and overall that has less pain but still sore and she is wearing a brace and rates it a 4 out of 10 pain.  On the back especially in the SI joint the piriformis muscle and the lateral hip where she fell she states she has an 8 out of 10 pain she is.  She has been somewhat frustrated that she is not getting better she talked to a chiropractor helped her mildly and she is actually scheduled to get an MRI of her lower back next week      Patient Active Problem List   Diagnosis    Traumatic injury during pregnancy    History of     Chronic hypertension with superimposed pre-eclampsia    Hypertension, postpartum condition or complication    Chronic heart failure with preserved ejection fraction (HCC)           Current Outpatient Medications on File Prior to Visit   Medication Sig Dispense Refill    amphetamine-dextroamphetamine (ADDERALL, 20MG,) 20 MG tablet Take 1 tablet by mouth 2 times daily for 31 days. 60 tablet 0    clonazePAM (KLONOPIN) 0.5 MG tablet Take 1 tablet by mouth nightly as needed for Anxiety for up to 30 days. Max Daily Amount: 0.5 mg 21 tablet 2    hydrOXYzine pamoate (VISTARIL) 25 MG capsule Take 1 capsule by mouth 3

## 2025-04-22 ENCOUNTER — HOSPITAL ENCOUNTER (OUTPATIENT)
Dept: PHYSICAL THERAPY | Age: 35
Setting detail: THERAPIES SERIES
Discharge: HOME | End: 2025-04-22
Payer: COMMERCIAL

## 2025-04-22 DIAGNOSIS — R26.89 FUNCTIONAL GAIT ABNORMALITY: ICD-10-CM

## 2025-04-22 DIAGNOSIS — M25.60 STIFFNESS IN JOINT: ICD-10-CM

## 2025-04-22 DIAGNOSIS — Z74.09 DECREASED FUNCTIONAL MOBILITY AND ENDURANCE: ICD-10-CM

## 2025-04-22 DIAGNOSIS — R52 PAIN: ICD-10-CM

## 2025-04-22 DIAGNOSIS — R53.1 WEAKNESS: Primary | ICD-10-CM

## 2025-04-22 PROCEDURE — 97161 PT EVAL LOW COMPLEX 20 MIN: CPT

## 2025-04-22 PROCEDURE — 97140 MANUAL THERAPY 1/> REGIONS: CPT

## 2025-04-22 PROCEDURE — 97530 THERAPEUTIC ACTIVITIES: CPT

## 2025-04-22 NOTE — PLAN OF CARE
Total Treatment Minutes 50        Charge Justification:  (65028) THERAPEUTIC ACTIVITY - use of dynamic activities to improve functional performance. (Ex include squatting, ascending/descending stairs, walking, bending, lifting, catching, throwing, pushing, pulling, jumping.)  Direct, one on one contact, billed in 15-minute increments.  (25500) MANUAL THERAPY -  Manual therapy techniques, 1 or more regions, each 15 minutes (Mobilization/manipulation, manual lymphatic drainage, manual traction) for the purpose of modulating pain, promoting relaxation,  increasing ROM, reducing/eliminating soft tissue swelling/inflammation/restriction, improving soft tissue extensibility and allowing for proper ROM for normal function with self care, mobility, lifting and ambulation    GOALS     Patient stated goal: \"start feeling better\"   [] Progressing: [] Met: [] Not Met: [] Adjusted    Therapist goals for Patient:   Short Term Goals: To be achieved in: 2-4 weeks  Independent in HEP and progression per patient tolerance, in order to prevent re-injury.   [] Progressing: [] Met: [] Not Met: [] Adjusted  Patient will have a decrease in pain by 20-30%to facilitate improvement in movement, function, and ADLs as indicated by Functional Deficits.  [] Progressing: [] Met: [] Not Met: [] Adjusted      Long Term Goals: To be achieved in: at DC  Disability index score of 45 or less for the WOMAC to assist with reaching prior level of function with activities such as sleeping without waking.  [] Progressing: [] Met: [] Not Met: [] Adjusted  Patient will demonstrate increased AROM of B hip flexibility to WFL without pain to allow for proper joint functioning to enable patient to dress LE independently.   [] Progressing: [] Met: [] Not Met: [] Adjusted  Patient will demonstrate increased Strength of B LE to at least 4+/5 throughout without pain and good core activation to allow for proper functional mobility to enable patient to return to

## 2025-04-24 ENCOUNTER — APPOINTMENT (OUTPATIENT)
Dept: PHYSICAL THERAPY | Age: 35
End: 2025-04-24
Payer: COMMERCIAL

## 2025-04-29 ENCOUNTER — HOSPITAL ENCOUNTER (OUTPATIENT)
Dept: PHYSICAL THERAPY | Age: 35
Setting detail: THERAPIES SERIES
Discharge: HOME | End: 2025-04-29
Payer: COMMERCIAL

## 2025-04-29 PROCEDURE — 97110 THERAPEUTIC EXERCISES: CPT

## 2025-04-29 PROCEDURE — 97140 MANUAL THERAPY 1/> REGIONS: CPT

## 2025-04-29 PROCEDURE — 97035 APP MDLTY 1+ULTRASOUND EA 15: CPT

## 2025-04-29 NOTE — FLOWSHEET NOTE
[facility] - Outpatient Rehabilitation and Therapy: [address] [place] [address] office: [phone] fax: [phone]         Physical Therapy: TREATMENT/PROGRESS NOTE   Patient: Blessing Yañez (34 y.o. female)   Examination Date: 2025   :  1990 MRN: [id number]   Visit #:   Insurance Allowable Auth Needed   25 vpcy  Hard max 0 used  []Yes    [x]No    Insurance: Payor: [place] BCBS / Plan: [employer] [facility] / Product Type: *No Product type* /   Insurance ID: [id number] - ([employer])  Secondary Insurance (if applicable):    Treatment Diagnosis:     ICD-10-CM    1. Weakness  R53.1       2. Stiffness in joint  M25.60       3. Functional gait abnormality  R26.89       4. Decreased functional mobility and endurance  Z74.09       5. Pain  R52          Medical Diagnosis:  Piriformis syndrome of left side [G57.02]   Referring Physician: Jason Dave PA  PCP: Brittney Russo MD     Plan of care signed (Y/N): inbox    Date of Patient follow up with Physician:      Plan of Care Report: NO  POC update due: (10 visits /OR AUTH LIMITS, whichever is less)  2025                                             Medical History:  Comorbidities:  Hypertension  Other: ADHD  Relevant Medical History: anxiety, OA                                         Precautions/ Contra-indications:           Latex allergy:  NO  Pacemaker:    NO  Contraindications for Manipulation: NA  Date of Surgery:   Other:    Red Flags:  None    Suicide Screening:   The patient did not verbalize a primary behavioral concern, suicidal ideation, suicidal intent, or demonstrate suicidal behaviors.    Preferred Language for Healthcare:   [x] English       [] other:    SUBJECTIVE EXAMINATION     Patient stated complaint: Pt had a fall on 3/10/25 at [facility] w/ R foot going fwd and L leg going back injuring her L knee, hip, back and R ankle. She had some success with chiropractor treatments ( still going 1 x week with

## 2025-05-01 ENCOUNTER — HOSPITAL ENCOUNTER (OUTPATIENT)
Dept: PHYSICAL THERAPY | Age: 35
Setting detail: THERAPIES SERIES
Discharge: HOME | End: 2025-05-01
Payer: COMMERCIAL

## 2025-05-01 PROCEDURE — 97035 APP MDLTY 1+ULTRASOUND EA 15: CPT

## 2025-05-01 PROCEDURE — 97140 MANUAL THERAPY 1/> REGIONS: CPT

## 2025-05-01 PROCEDURE — 97110 THERAPEUTIC EXERCISES: CPT

## 2025-05-01 NOTE — FLOWSHEET NOTE
(Mobilization/manipulation, manual lymphatic drainage, manual traction) for the purpose of modulating pain, promoting relaxation,  increasing ROM, reducing/eliminating soft tissue swelling/inflammation/restriction, improving soft tissue extensibility and allowing for proper ROM for normal function with self care, mobility, lifting and ambulation    GOALS     Patient stated goal: \"start feeling better\"   [] Progressing: [] Met: [] Not Met: [] Adjusted    Therapist goals for Patient:   Short Term Goals: To be achieved in: 2-4 weeks  Independent in HEP and progression per patient tolerance, in order to prevent re-injury.   [] Progressing: [] Met: [] Not Met: [] Adjusted  Patient will have a decrease in pain by 20-30%to facilitate improvement in movement, function, and ADLs as indicated by Functional Deficits.  [] Progressing: [] Met: [] Not Met: [] Adjusted      Long Term Goals: To be achieved in: at DC  Disability index score of 45 or less for the WOMAC to assist with reaching prior level of function with activities such as sleeping without waking.  [] Progressing: [] Met: [] Not Met: [] Adjusted  Patient will demonstrate increased AROM of B hip flexibility to WFL without pain to allow for proper joint functioning to enable patient to dress LE independently.   [] Progressing: [] Met: [] Not Met: [] Adjusted  Patient will demonstrate increased Strength of B LE to at least 4+/5 throughout without pain and good core activation to allow for proper functional mobility to enable patient to return to lifting and light housework.   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   [] Progressing: [] Met: [] Not Met: [] Adjusted  \"Normal activities without pain\"(patient specific functional goal)    [] Progressing: [] Met: [] Not Met: [] Adjusted          Overall Progression Towards Functional goals/ Treatment Progress Update:  [] Patient is

## 2025-05-06 ENCOUNTER — HOSPITAL ENCOUNTER (OUTPATIENT)
Dept: PHYSICAL THERAPY | Age: 35
Setting detail: THERAPIES SERIES
End: 2025-05-06
Payer: COMMERCIAL

## 2025-05-08 ENCOUNTER — HOSPITAL ENCOUNTER (OUTPATIENT)
Dept: PHYSICAL THERAPY | Age: 35
Setting detail: THERAPIES SERIES
End: 2025-05-08
Payer: COMMERCIAL

## 2025-05-13 ENCOUNTER — HOSPITAL ENCOUNTER (OUTPATIENT)
Dept: PHYSICAL THERAPY | Age: 35
Setting detail: THERAPIES SERIES
End: 2025-05-13
Payer: COMMERCIAL

## 2025-05-15 ENCOUNTER — APPOINTMENT (OUTPATIENT)
Dept: PHYSICAL THERAPY | Age: 35
End: 2025-05-15
Payer: COMMERCIAL

## 2025-05-20 ENCOUNTER — APPOINTMENT (OUTPATIENT)
Dept: PHYSICAL THERAPY | Age: 35
End: 2025-05-20
Payer: COMMERCIAL

## 2025-08-26 ENCOUNTER — OFFICE VISIT (OUTPATIENT)
Dept: BARIATRICS/WEIGHT MGMT | Age: 35
End: 2025-08-26
Payer: COMMERCIAL

## 2025-08-26 VITALS
OXYGEN SATURATION: 98 % | WEIGHT: 293 LBS | RESPIRATION RATE: 18 BRPM | SYSTOLIC BLOOD PRESSURE: 150 MMHG | HEIGHT: 67 IN | HEART RATE: 77 BPM | DIASTOLIC BLOOD PRESSURE: 84 MMHG | BODY MASS INDEX: 45.99 KG/M2

## 2025-08-26 DIAGNOSIS — K21.9 CHRONIC GERD: ICD-10-CM

## 2025-08-26 DIAGNOSIS — E66.01 MORBID OBESITY WITH BMI OF 45.0-49.9, ADULT (HCC): Primary | ICD-10-CM

## 2025-08-26 DIAGNOSIS — I50.32 CHRONIC HEART FAILURE WITH PRESERVED EJECTION FRACTION (HCC): ICD-10-CM

## 2025-08-26 DIAGNOSIS — I10 ESSENTIAL HYPERTENSION: ICD-10-CM

## 2025-08-26 PROCEDURE — 99205 OFFICE O/P NEW HI 60 MIN: CPT | Performed by: SURGERY

## 2025-08-26 PROCEDURE — G2211 COMPLEX E/M VISIT ADD ON: HCPCS | Performed by: SURGERY

## 2025-08-26 PROCEDURE — 3077F SYST BP >= 140 MM HG: CPT | Performed by: SURGERY

## 2025-08-26 PROCEDURE — 3079F DIAST BP 80-89 MM HG: CPT | Performed by: SURGERY

## 2025-08-28 ENCOUNTER — TELEPHONE (OUTPATIENT)
Dept: CARDIOLOGY CLINIC | Age: 35
End: 2025-08-28

## (undated) DEVICE — DRESSING COMP IS W4XL10IN PD W2XL8IN CNTCT LAYR ADH

## (undated) DEVICE — SUTURE COAT VCRL SZ 4-0 L18IN ABSRB UD L19MM PS-2 1/2 CIR J496G

## (undated) DEVICE — 3M™ STERI-STRIP™ COMPOUND BENZOIN TINCTURE 40 BAGS/CARTON 4 CARTONS/CASE C1544: Brand: 3M™ STERI-STRIP™

## (undated) DEVICE — SUTURE VCRL SZ 0 L36IN ABSRB UD L36MM CT-1 1/2 CIR J946H

## (undated) DEVICE — S/USE RESUS KIT W/O MASK (10): Brand: FISHER & PAYKEL HEALTHCARE

## (undated) DEVICE — SUTURE VCRL SZ 3-0 L36IN ABSRB UD L36MM CT-1 1/2 CIR J944H

## (undated) DEVICE — GARMENT COMPR L FOR 23IN CALF FLOTRN

## (undated) DEVICE — PAD,NON-ADHERENT,3X8,STERILE,LF,1/PK: Brand: MEDLINE

## (undated) DEVICE — SUTURE ABSORBABLE BRAIDED 2-0 CT-1 27 IN UD VICRYL J259H

## (undated) DEVICE — GLOVE SURG SZ 65 THK91MIL LTX FREE SYN POLYISOPRENE

## (undated) DEVICE — BLADE CLIPPER GEN PURP NS

## (undated) DEVICE — TRAY URIN CATH 16FR DRNGE BG STATLOK STBL DEV F SURSTP

## (undated) DEVICE — Z INACTIVE NO ACTIVE SUPPLIER APPLICATOR MEDICATED 26 CC TINT HI-LITE ORNG STRL CHLORAPREP

## (undated) DEVICE — Device